# Patient Record
Sex: FEMALE | Employment: FULL TIME | URBAN - METROPOLITAN AREA
[De-identification: names, ages, dates, MRNs, and addresses within clinical notes are randomized per-mention and may not be internally consistent; named-entity substitution may affect disease eponyms.]

---

## 2018-06-17 ENCOUNTER — HOSPITAL ENCOUNTER (EMERGENCY)
Facility: CLINIC | Age: 23
Discharge: HOME | End: 2018-06-17
Attending: EMERGENCY MEDICINE | Admitting: EMERGENCY MEDICINE
Payer: COMMERCIAL

## 2018-06-17 VITALS
OXYGEN SATURATION: 99 % | TEMPERATURE: 98 F | SYSTOLIC BLOOD PRESSURE: 132 MMHG | RESPIRATION RATE: 18 BRPM | WEIGHT: 217 LBS | DIASTOLIC BLOOD PRESSURE: 92 MMHG | HEART RATE: 81 BPM

## 2018-06-17 DIAGNOSIS — R10.13 ABDOMINAL PAIN, EPIGASTRIC: ICD-10-CM

## 2018-06-17 LAB
ALBUMIN SERPL-MCNC: 3.7 G/DL (ref 3.4–5)
ALP SERPL-CCNC: 69 U/L (ref 40–150)
ALT SERPL W P-5'-P-CCNC: 28 U/L (ref 0–50)
ANION GAP SERPL CALCULATED.3IONS-SCNC: 6 MMOL/L (ref 3–14)
AST SERPL W P-5'-P-CCNC: 23 U/L (ref 0–45)
BASOPHILS # BLD AUTO: 0 10E9/L (ref 0–0.2)
BASOPHILS NFR BLD AUTO: 0.2 %
BILIRUB SERPL-MCNC: 0.2 MG/DL (ref 0.2–1.3)
BUN SERPL-MCNC: 17 MG/DL (ref 7–30)
CALCIUM SERPL-MCNC: 9.1 MG/DL (ref 8.5–10.1)
CHLORIDE SERPL-SCNC: 108 MMOL/L (ref 94–109)
CO2 SERPL-SCNC: 24 MMOL/L (ref 20–32)
CREAT SERPL-MCNC: 0.68 MG/DL (ref 0.52–1.04)
DIFFERENTIAL METHOD BLD: NORMAL
EOSINOPHIL # BLD AUTO: 0.2 10E9/L (ref 0–0.7)
EOSINOPHIL NFR BLD AUTO: 2.1 %
ERYTHROCYTE [DISTWIDTH] IN BLOOD BY AUTOMATED COUNT: 12.9 % (ref 10–15)
GFR SERPL CREATININE-BSD FRML MDRD: >90 ML/MIN/1.7M2
GLUCOSE SERPL-MCNC: 103 MG/DL (ref 70–99)
HCG SERPL QL: NEGATIVE
HCT VFR BLD AUTO: 41.9 % (ref 35–47)
HGB BLD-MCNC: 14.4 G/DL (ref 11.7–15.7)
IMM GRANULOCYTES # BLD: 0.1 10E9/L (ref 0–0.4)
IMM GRANULOCYTES NFR BLD: 0.6 %
LIPASE SERPL-CCNC: 129 U/L (ref 73–393)
LYMPHOCYTES # BLD AUTO: 2.5 10E9/L (ref 0.8–5.3)
LYMPHOCYTES NFR BLD AUTO: 30.4 %
MCH RBC QN AUTO: 30.3 PG (ref 26.5–33)
MCHC RBC AUTO-ENTMCNC: 34.4 G/DL (ref 31.5–36.5)
MCV RBC AUTO: 88 FL (ref 78–100)
MONOCYTES # BLD AUTO: 0.7 10E9/L (ref 0–1.3)
MONOCYTES NFR BLD AUTO: 8.2 %
NEUTROPHILS # BLD AUTO: 4.7 10E9/L (ref 1.6–8.3)
NEUTROPHILS NFR BLD AUTO: 58.5 %
NRBC # BLD AUTO: 0 10*3/UL
NRBC BLD AUTO-RTO: 0 /100
PLATELET # BLD AUTO: 306 10E9/L (ref 150–450)
POTASSIUM SERPL-SCNC: 4.2 MMOL/L (ref 3.4–5.3)
PROT SERPL-MCNC: 8.1 G/DL (ref 6.8–8.8)
RBC # BLD AUTO: 4.76 10E12/L (ref 3.8–5.2)
SODIUM SERPL-SCNC: 138 MMOL/L (ref 133–144)
WBC # BLD AUTO: 8.1 10E9/L (ref 4–11)

## 2018-06-17 PROCEDURE — 84703 CHORIONIC GONADOTROPIN ASSAY: CPT | Performed by: EMERGENCY MEDICINE

## 2018-06-17 PROCEDURE — 96374 THER/PROPH/DIAG INJ IV PUSH: CPT

## 2018-06-17 PROCEDURE — 99284 EMERGENCY DEPT VISIT MOD MDM: CPT | Mod: 25

## 2018-06-17 PROCEDURE — 80053 COMPREHEN METABOLIC PANEL: CPT | Performed by: EMERGENCY MEDICINE

## 2018-06-17 PROCEDURE — 25000125 ZZHC RX 250: Performed by: EMERGENCY MEDICINE

## 2018-06-17 PROCEDURE — 83690 ASSAY OF LIPASE: CPT | Performed by: EMERGENCY MEDICINE

## 2018-06-17 PROCEDURE — 85025 COMPLETE CBC W/AUTO DIFF WBC: CPT | Performed by: EMERGENCY MEDICINE

## 2018-06-17 PROCEDURE — 25000132 ZZH RX MED GY IP 250 OP 250 PS 637: Performed by: EMERGENCY MEDICINE

## 2018-06-17 RX ADMIN — FAMOTIDINE 20 MG: 10 INJECTION INTRAVENOUS at 13:28

## 2018-06-17 RX ADMIN — LIDOCAINE HYDROCHLORIDE 30 ML: 20 SOLUTION ORAL; TOPICAL at 13:28

## 2018-06-17 ASSESSMENT — ENCOUNTER SYMPTOMS
DIARRHEA: 0
CONSTIPATION: 0
VOMITING: 0
ABDOMINAL PAIN: 1
NAUSEA: 1

## 2018-06-17 NOTE — ED AVS SNAPSHOT
Emergency Department    [address] [facility] [address]    Phone:  [phone]    Fax:  [phone]                                       Paris Reddy   MRN: [id number]    Department:   Emergency Department   Date of Visit:  6/17/2018           Patient Information     Date Of Birth          1995        Your diagnoses for this visit were:     Abdominal pain, epigastric        You were seen by Cedric Crouch MD.      Follow-up Information     Call Your Primary Care Doctor.        Follow up with  Emergency Department.    Specialty:  EMERGENCY MEDICINE    Why:  As needed, If symptoms worsen    Contact information:    [address] [facility] [address] [phone]        Discharge Instructions         Gastritis (Adult)    Gastritis is inflammation and irritation of the stomach lining. It can be present for a short time (acute) or be long lasting (chronic). Gastritis is often caused by infection with bacteria called H pylori. More than a third of people in the  have this bacteria in their bodies. In many cases, H pylori causes no problems or symptoms. In some people, though, the infection irritates the stomach lining and causes gastritis. Other causes of stomach irritation include drinking alcohol or taking pain-relieving medicines called NSAIDs (such as aspirin or ibuprofen).   Symptoms of gastritis can include:    Abdominal pain or bloating    Loss of appetite    Nausea or vomiting    Vomiting blood or having black stools    Feeling more tired than usual  An inflamed and irritated stomach lining is more likely to develop a sore called an ulcer. To help prevent this, gastritis should be treated.  Home care  If needed, medicines may be prescribed. If you have H pylori infection, treating it will likely relieve your symptoms. Other changes can help reduce stomach irritation and help it heal.    If you have been prescribed medicines for H pylori infection,  take them as directed. Take all of the medicine until it is finished or your healthcare provider tells you to stop, even if you feel better.    Your healthcare provider may recommend avoiding NSAIDs. If you take daily aspirin for your heart or other medical reasons, do not stop without talking to your healthcare provider first.    Avoid drinking alcohol.    Stop smoking. Smoking can irritate the stomach and delay healing. As much as possible, stay away from second hand smoke.  Follow-up care  Follow up with your healthcare provider, or as advised by our staff. Testing may be needed to check for inflammation or an ulcer.  When to seek medical advice  Call your healthcare provider for any of the following:    Stomach pain that gets worse or moves to the lower right abdomen (appendix area)    Chest pain that appears or gets worse, or spreads to the back, neck, shoulder, or arm    Frequent vomiting (can t keep down liquids)    Blood in the stool or vomit (red or black in color)    Feeling weak or dizzy    Fever of 100.4 F (38 C) or higher, or as directed by your healthcare provider  Date Last Reviewed: 6/22/2015 2000-2017 [employer] [address] [place] [address]. All rights reserved. This information is not intended as a substitute for professional medical care. Always follow your healthcare professional's instructions.          24 Hour Appointment Hotline       To make an appointment at any [facility] clinic, call [phone] ([phone]). If you don't have a family doctor or clinic, we will help you find one. [facility] clinics are conveniently located to serve the needs of you and your family.             Review of your medicines      START taking        Dose / Directions Last dose taken    omeprazole 20 MG CR capsule   Commonly known as:  priLOSEC   Dose:  20 mg   Quantity:  15 capsule        Take 1 capsule (20 mg) by mouth daily   Refills:  0          Our records show that you are  taking the medicines listed below. If these are incorrect, please call your family doctor or clinic.        Dose / Directions Last dose taken    SERTRALINE HCL PO        Take by mouth daily   Refills:  0                Prescriptions were sent or printed at these locations (1 Prescription)                   Other Prescriptions                Printed at Department/Unit printer (1 of 1)         omeprazole (PRILOSEC) 20 MG CR capsule                Procedures and tests performed during your visit     CBC with platelets + differential    Comprehensive metabolic panel    HCG QUALitative pregnancy (blood)    Lipase      Orders Needing Specimen Collection     None      Pending Results     No orders found from 6/15/2018 to 6/18/2018.            Pending Culture Results     No orders found from 6/15/2018 to 6/18/2018.            Pending Results Instructions     If you had any lab results that were not finalized at the time of your Discharge, you can call the ED Lab Result RN at [phone]. You will be contacted by this team for any positive Lab results or changes in treatment. The nurses are available 7 days a week from 10A to 6:30P.  You can leave a message 24 hours per day and they will return your call.        Test Results From Your Hospital Stay        6/17/2018  1:37 PM      Component Results     Component Value Ref Range & Units Status    WBC 8.1 4.0 - 11.0 10e9/L Final    RBC Count 4.76 3.8 - 5.2 10e12/L Final    Hemoglobin 14.4 11.7 - 15.7 g/dL Final    Hematocrit 41.9 35.0 - 47.0 % Final    MCV 88 78 - 100 fl Final    MCH 30.3 26.5 - 33.0 pg Final    MCHC 34.4 31.5 - 36.5 g/dL Final    RDW 12.9 10.0 - 15.0 % Final    Platelet Count 306 150 - 450 10e9/L Final    Diff Method Automated Method  Final    % Neutrophils 58.5 % Final    % Lymphocytes 30.4 % Final    % Monocytes 8.2 % Final    % Eosinophils 2.1 % Final    % Basophils 0.2 % Final    % Immature Granulocytes 0.6 % Final    Nucleated RBCs 0 0 /100 Final     Absolute Neutrophil 4.7 1.6 - 8.3 10e9/L Final    Absolute Lymphocytes 2.5 0.8 - 5.3 10e9/L Final    Absolute Monocytes 0.7 0.0 - 1.3 10e9/L Final    Absolute Eosinophils 0.2 0.0 - 0.7 10e9/L Final    Absolute Basophils 0.0 0.0 - 0.2 10e9/L Final    Abs Immature Granulocytes 0.1 0 - 0.4 10e9/L Final    Absolute Nucleated RBC 0.0  Final         6/17/2018  1:55 PM      Component Results     Component Value Ref Range & Units Status    Sodium 138 133 - 144 mmol/L Final    Potassium 4.2 3.4 - 5.3 mmol/L Final    Chloride 108 94 - 109 mmol/L Final    Carbon Dioxide 24 20 - 32 mmol/L Final    Anion Gap 6 3 - 14 mmol/L Final    Glucose 103 (H) 70 - 99 mg/dL Final    Urea Nitrogen 17 7 - 30 mg/dL Final    Creatinine 0.68 0.52 - 1.04 mg/dL Final    GFR Estimate >90 >60 mL/min/1.7m2 Final    Non  GFR Calc    GFR Estimate If Black >90 >60 mL/min/1.7m2 Final    African American GFR Calc    Calcium 9.1 8.5 - 10.1 mg/dL Final    Bilirubin Total 0.2 0.2 - 1.3 mg/dL Final    Albumin 3.7 3.4 - 5.0 g/dL Final    Protein Total 8.1 6.8 - 8.8 g/dL Final    Alkaline Phosphatase 69 40 - 150 U/L Final    ALT 28 0 - 50 U/L Final    AST 23 0 - 45 U/L Final         6/17/2018  1:53 PM      Component Results     Component Value Ref Range & Units Status    Lipase 129 73 - 393 U/L Final         6/17/2018  1:48 PM      Component Results     Component Value Ref Range & Units Status    HCG Qualitative Serum Negative NEG^Negative Final    This test is for screening purposes.  Results should be interpreted along with   the clinical picture.  Confirmation testing is available if warranted by   ordering [id number], HCG Quantitative Pregnancy.                  Clinical Quality Measure: Blood Pressure Screening     Your blood pressure was checked while you were in the emergency department today. The last reading we obtained was  BP: 127/90 . Please read the guidelines below about what these numbers mean and what you should do about them.  If  "your systolic blood pressure (the top number) is less than 120 and your diastolic blood pressure (the bottom number) is less than 80, then your blood pressure is normal. There is nothing more that you need to do about it.  If your systolic blood pressure (the top number) is 120-139 or your diastolic blood pressure (the bottom number) is 80-89, your blood pressure may be higher than it should be. You should have your blood pressure rechecked within a year by a primary care provider.  If your systolic blood pressure (the top number) is 140 or greater or your diastolic blood pressure (the bottom number) is 90 or greater, you may have high blood pressure. High blood pressure is treatable, but if left untreated over time it can put you at risk for heart attack, stroke, or kidney failure. You should have your blood pressure rechecked by a primary care provider within the next 4 weeks.  If your provider in the emergency department today gave you specific instructions to follow-up with your doctor or provider even sooner than that, you should follow that instruction and not wait for up to 4 weeks for your follow-up visit.        Thank you for choosing [facility]       Thank you for choosing [facility] for your care. Our goal is always to provide you with excellent care. Hearing back from our patients is one way we can continue to improve our services. Please take a few minutes to complete the written survey that you may receive in the mail after you visit with us. Thank you!        [employer] Information     [employer] lets you send messages to your doctor, view your test results, renew your prescriptions, schedule appointments and more. To sign up, go to [email or web address] . Click on \"Log in\" on the left side of the screen, which will take you to the Welcome page. Then click on \"Sign up Now\" on the right side of the page.     You will be asked to enter the access code listed below, as well as some personal information. Please " follow the directions to create your username and password.     Your access code is: [email or web address]  Expires: 9/15/2018  2:39 PM     Your access code will  in 90 days. If you need help or a new code, please call your [facility] clinic or [phone].        Care EveryWhere ID     This is your Care EveryWhere ID. This could be used by other organizations to access your [facility] medical records  [id number]        Equal Access to Services     [facility] : Hadii aad ku hadasho Soomaali, waaxda luqadaha, qaybta kaalmada adetiff, saurav hickey. So [facility] [phone].    ATENCIÓN: Si habla español, tiene a brian disposición servicios gratuitos de asistencia lingüística. Llame al [phone].    We comply with applicable federal civil rights laws and [place] laws. We do not discriminate on the basis of race, color, national origin, age, disability, sex, sexual orientation, or gender identity.            After Visit Summary       This is your record. Keep this with you and show to your community pharmacist(s) and doctor(s) at your next visit.

## 2018-06-17 NOTE — ED AVS SNAPSHOT
Emergency Department    [address] [place] [address]    Phone:  [phone]    Fax:  [phone]                                       Paris Reddy   MRN: [id number]    Department:   Emergency Department   Date of Visit:  6/17/2018           After Visit Summary Signature Page     I have received my discharge instructions, and my questions have been answered. I have discussed any challenges I see with this plan with the nurse or doctor.    ..........................................................................................................................................  Patient/Patient Representative Signature      ..........................................................................................................................................  Patient Representative Print Name and Relationship to Patient    ..................................................               ................................................  Date                                            Time    ..........................................................................................................................................  Reviewed by Signature/Title    ...................................................              ..............................................  Date                                                            Time

## 2018-06-17 NOTE — ED PROVIDER NOTES
History     Chief Complaint:  Abdominal Pain       HPI   Paris Reddy is a 22 year old female who presents to the emergency department today for evaluation of abdominal pain. The patient reports she developed upper abdominal pain 1 hour prior to arrival. She has taken Zofran for her nausea prior to arrival. Additionally, she has had nothing to eat or drink today but did have pizza last night. She has had this pain before about once each month for the last couple of months. She did have her gallstone removed and compares her symptoms to that experience. She denies diarrhea, constipation, emesis, and urinary symptoms.       Allergies:  No Known Drug Allergies        Medications:    SERTRALINE HCL PO       Past Medical History:    History reviewed. No pertinent past medical history.       Past Surgical History:    Cholecystectomy      Family History:    History reviewed. No pertinent family history.      Social History:  The patient was accompanied to the ED by family.  Smoking Status: Never Smoker  Smokeless Tobacco: Never Used  Alcohol Use: Yes   Marital Status:  Single [1]       Review of Systems   Constitutional: Negative for fever.   Respiratory: Negative for cough and shortness of breath.    Cardiovascular: Negative for chest pain.   Gastrointestinal: Positive for abdominal pain (upper) and nausea. Negative for constipation, diarrhea and vomiting.   Genitourinary: Negative.          Physical Exam   First Vitals:  BP: 127/90  Pulse: 88  Heart Rate: 88  Temp: 98  F (36.7  C)  Resp: 16  Weight: 98.4 kg (217 lb)  SpO2: 96 %      Physical Exam  General: Appears well-developed and well-nourished.   Head: No signs of trauma.   CV: Normal rate and regular rhythm.    Resp: Effort normal and breath sounds normal. No respiratory distress.   GI: Soft. There is mild epigastric tenderness.  No rebound or guarding.  Normal bowel sounds.  No CVA tenderness.  MSK: Normal range of motion. no edema. No Calf  tenderness.  Neuro: The patient is alert and oriented.  Speech normal.  GCS 15  Skin: Skin is warm and dry. No rash noted.   Psych: normal mood and affect. behavior is normal.     Emergency Department Course     Laboratory:  Laboratory findings were communicated with the patient who voiced understanding of the findings.    CBC: WBC 8.1, HGB 14.4,    CMP: Glucose 103 (H) o/w WNL. (Creatinine 0.68)   Lipase: 129   HCG Qualitative pregnancy (blood): Negative    Interventions:  1328 GI Cocktail 30mg PO   1328 Pepcid 20 mg IV       Emergency Department Course:  Nursing notes and vitals reviewed.  1318: I performed an exam of the patient as documented above.   IV was inserted and blood was drawn for laboratory testing, results above.   1426 Patient rechecked and updated.    Findings and plan explained to the Patient. Patient discharged home with instructions regarding supportive care, medications, and reasons to return. The importance of close follow-up was reviewed.   I personally reviewed the laboratory  results with the Patient and answered all related questions prior to  discharge.   Impression & Plan      Medical Decision Making:  Paris Reddy is a 22-year-old woman presents due to epigastric abdominal pain.  Pain started approximately an 1.5 hour prior to arrival.  Upon my evaluation, she did have some mild epigastric tenderness but no peritoneal findings.  She has a history of a cholecystectomy in the past.  Blood work was obtained that did not show any concerning findings with normal LFTs and lipase.  Patient did report that she had pizza and beer last night.  Based on her negative workup, history, and exam I feel that she likely has a degree of gastritis.  Patient was discharged home with recommendations for diet modification along with omeprazole.  She instructed to return to the ER if she had worsening pain, fevers, or other new concerning symptoms.      Diagnosis:    ICD-10-CM    1. Abdominal pain,  epigastric R10.13        Disposition:  Discharged to home with the below prescription.    Discharge Medications:  New Prescriptions    OMEPRAZOLE (PRILOSEC) 20 MG CR CAPSULE    Take 1 capsule (20 mg) by mouth daily       Scribe Disclosure:  I, Carolyne Almodovar, am serving as a scribe at 1:17 PM on 6/17/2018 to document services personally performed by Cedric Crouch MD based on my observations and the provider's statements to me.    Carolyne Almodovar  6/17/2018    EMERGENCY DEPARTMENT       Cedric Crouch MD  06/18/18 [id number]

## 2018-06-17 NOTE — DISCHARGE INSTRUCTIONS
Gastritis (Adult)    Gastritis is inflammation and irritation of the stomach lining. It can be present for a short time (acute) or be long lasting (chronic). Gastritis is often caused by infection with bacteria called H pylori. More than a third of people in the [place] have this bacteria in their bodies. In many cases, H pylori causes no problems or symptoms. In some people, though, the infection irritates the stomach lining and causes gastritis. Other causes of stomach irritation include drinking alcohol or taking pain-relieving medicines called NSAIDs (such as aspirin or ibuprofen).   Symptoms of gastritis can include:    Abdominal pain or bloating    Loss of appetite    Nausea or vomiting    Vomiting blood or having black stools    Feeling more tired than usual  An inflamed and irritated stomach lining is more likely to develop a sore called an ulcer. To help prevent this, gastritis should be treated.  Home care  If needed, medicines may be prescribed. If you have H pylori infection, treating it will likely relieve your symptoms. Other changes can help reduce stomach irritation and help it heal.    If you have been prescribed medicines for H pylori infection, take them as directed. Take all of the medicine until it is finished or your healthcare provider tells you to stop, even if you feel better.    Your healthcare provider may recommend avoiding NSAIDs. If you take daily aspirin for your heart or other medical reasons, do not stop without talking to your healthcare provider first.    Avoid drinking alcohol.    Stop smoking. Smoking can irritate the stomach and delay healing. As much as possible, stay away from second hand smoke.  Follow-up care  Follow up with your healthcare provider, or as advised by our staff. Testing may be needed to check for inflammation or an ulcer.  When to seek medical advice  Call your healthcare provider for any of the following:    Stomach pain that gets worse or moves to the lower  right abdomen (appendix area)    Chest pain that appears or gets worse, or spreads to the back, neck, shoulder, or arm    Frequent vomiting (can t keep down liquids)    Blood in the stool or vomit (red or black in color)    Feeling weak or dizzy    Fever of 100.4 F (38 C) or higher, or as directed by your healthcare provider  Date Last Reviewed: 6/22/2015 2000-2017 [employer]. [address] [place] [address], [place] [id number]. All rights reserved. This information is not intended as a substitute for professional medical care. Always follow your healthcare professional's instructions.

## 2018-06-18 ASSESSMENT — ENCOUNTER SYMPTOMS
SHORTNESS OF BREATH: 0
COUGH: 0
FEVER: 0

## 2018-07-22 ENCOUNTER — OFFICE VISIT (OUTPATIENT)
Dept: URGENT CARE | Facility: URGENT CARE | Age: 23
End: 2018-07-22
Payer: COMMERCIAL

## 2018-07-22 VITALS
SYSTOLIC BLOOD PRESSURE: 123 MMHG | HEART RATE: 80 BPM | WEIGHT: 230 LBS | OXYGEN SATURATION: 99 % | DIASTOLIC BLOOD PRESSURE: 86 MMHG | TEMPERATURE: 98.2 F

## 2018-07-22 DIAGNOSIS — B02.9 HERPES ZOSTER WITHOUT COMPLICATION: Primary | ICD-10-CM

## 2018-07-22 PROCEDURE — 99203 OFFICE O/P NEW LOW 30 MIN: CPT | Performed by: FAMILY MEDICINE

## 2018-07-22 RX ORDER — FAMCICLOVIR 500 MG/1
500 TABLET ORAL 3 TIMES DAILY
Qty: 21 TABLET | Refills: 0 | Status: SHIPPED | OUTPATIENT
Start: 2018-07-22 | End: 2019-03-04

## 2018-07-22 RX ORDER — COPPER 313.4 MG/1
1 INTRAUTERINE DEVICE INTRAUTERINE ONCE
COMMUNITY
End: 2019-02-08 | Stop reason: ALTCHOICE

## 2018-07-22 NOTE — MR AVS SNAPSHOT
"              After Visit Summary   2018    Paris Reddy    MRN: [id number]           Patient Information     Date Of Birth          1995        Visit Information        Provider Department      2018 2:25 PM Glenn Che MD [facility]        Today's Diagnoses     Herpes zoster without complication    -  1       Follow-ups after your visit        Who to contact     If you have questions or need follow up information about today's clinic visit or your schedule please contact [facility] directly at [phone].  Normal or non-critical lab and imaging results will be communicated to you by WhereNethart, letter or phone within 4 business days after the clinic has received the results. If you do not hear from us within 7 days, please contact the clinic through WhereNethart or phone. If you have a critical or abnormal lab result, we will notify you by phone as soon as possible.  Submit refill requests through brick&mobile or call your pharmacy and they will forward the refill request to us. Please allow 3 business days for your refill to be completed.          Additional Information About Your Visit        MyChart Information     brick&mobile lets you send messages to your doctor, view your test results, renew your prescriptions, schedule appointments and more. To sign up, go to [email or web address] . Click on \"Log in\" on the left side of the screen, which will take you to the Welcome page. Then click on \"Sign up Now\" on the right side of the page.     You will be asked to enter the access code listed below, as well as some personal information. Please follow the directions to create your username and password.     Your access code is: [email or web address]  Expires: 9/15/2018  2:39 PM     Your access code will  in 90 days. If you need help or a new code, please call your [place] clinic or [phone].        Care EveryWhere ID     This is your Care EveryWhere ID. " This could be used by other organizations to access your [place] medical records  [id number]        Your Vitals Were     Pulse Temperature Pulse Oximetry             80 98.2  F (36.8  C) (Tympanic) 99%          Blood Pressure from Last 3 Encounters:   07/22/18 123/86   06/17/18 (!) 132/92   07/17/14 110/80    Weight from Last 3 Encounters:   07/22/18 230 lb (104.3 kg)   06/17/18 217 lb (98.4 kg)   07/17/14 200 lb (90.7 kg) (97 %)*     * Growth percentiles are based on [facility] 2-20 Years data.              Today, you had the following     No orders found for display         Today's Medication Changes          These changes are accurate as of 7/22/18  2:47 PM.  If you have any questions, ask your nurse or doctor.               Start taking these medicines.        Dose/Directions    famciclovir 500 MG tablet   Commonly known as:  FAMVIR   Used for:  Herpes zoster without complication   Started by:  Glenn Che MD        Dose:  500 mg   Take 1 tablet (500 mg) by mouth 3 times daily   Quantity:  21 tablet   Refills:  0            Where to get your medicines      These medications were sent to [facility] [address] [place] [address] AT [address] [address]    Hours:  24-hours Phone:  [phone]     famciclovir 500 MG tablet                Primary Care Provider Fax #    Physician No Ref-Primary [phone]       No address on file        Equal Access to Services     YUAN ORDOÑEZ : Hadii ailyn oakleyo Sodejuanali, waaxda luqadaha, qaybta kaalmada adeegyada, wax radha hickey. So [facility] [phone].    ATENCIÓN: Si habla español, tiene a brian disposición servicios gratuitos de asistencia lingüística. Llame al [phone].    We comply with applicable federal civil rights laws and [place] laws. We do not discriminate on the basis of race, color, national origin, age, disability, sex, sexual orientation, or gender  identity.            Thank you!     Thank you for choosing [facility]  for your care. Our goal is always to provide you with excellent care. Hearing back from our patients is one way we can continue to improve our services. Please take a few minutes to complete the written survey that you may receive in the mail after your visit with us. Thank you!             Your Updated Medication List - Protect others around you: Learn how to safely use, store and throw away your medicines at www.disposemymeds.org.          This list is accurate as of 7/22/18  2:47 PM.  Always use your most recent med list.                   Brand Name Dispense Instructions for use Diagnosis    famciclovir 500 MG tablet    FAMVIR    21 tablet    Take 1 tablet (500 mg) by mouth 3 times daily    Herpes zoster without complication       paragard intrauterine copper      1 each by Intrauterine route once        SERTRALINE HCL PO      Take by mouth daily

## 2018-07-22 NOTE — PROGRESS NOTES
Subjective: 4 days ago patient developed some pain in the posterior head on the right side coming forward, was seen in urgent care but she did not have any cutaneous signs at that point and they were wondering about shingles, and today she has developed a little patch of clustered raised bumps on the right cheek.  He remains very painful.    Objective: The right cheek area is mildly swollen but there is a grouping of raised papules on the right cheek and possibly one in the posterior scalp as well but it is harder to see.  She has a nonenlarged lymph node in that area and in the right neck.  She appears well.    Assessment and plan: Pattern certainly suggests shingles and the grouped vesicles suggests shingles as well.  Discussed infectivity and likely course, will treat with Famvir for 7 days.

## 2019-02-04 NOTE — PROGRESS NOTES
[facility]  Primary Care Center   Monae Cochran, SHAKIRA CNP  02/08/2019      Chief Complaint:   Establish Care       History of Present Illness:   Paris Reddy is a 23 year old female with a history of depression who presents to establish care    She was previously seen at [facility]. Was in-between jobs and lost insurance, but now has coverage again and needs to re-establish.  Was being seen for depression, taking 200 mg Sertraline--did not feel this helped very much, maybe a little bit but didn't feel like it helped to where she wanted to be. She stopped the Sertraline when she lost her insurance coverage several months ago, and didn't feel any difference with stopping this, did not experience withdrawal symptoms when coming off the medication, which makes her feel like it wasn't doing anything anyway. Feels her mood is okay overall, not as sad as she was previously--it was more situational.    Primarily concerned that she has had binge eating disorder over the last 6 months. Has been since working full-time and taking classes as well. She used to binge 2-3x per week, but now binging almost every day, feels uncontrollable. Has been trying to get back on track--able to control more when at work, since she only brings an appropriate amount of food, but when she goes home it feels hard to control and she eats even when she isn't hungry. This has caused a sense of feeling guilty and disappointed in herself. She has gained weight as well, which she is discouraged about.     Also inquires about her heartrate--when training for this job a nurse told her she has irregular heart beat. Occasionally feels a palpitation, lasts a couple seconds, no CP, SOB.     Review of Systems:   Pertinent items are noted in HPI, remainder of complete ROS is negative.    PHQ-9 SCORE 2/8/2019   PHQ-9 Total Score 9       Active Medications:     Current Outpatient Medications:      FLUoxetine 20 MG tablet, Take 1  tablet (20 mg) by mouth daily, Disp: 90 tablet, Rfl: 1     famciclovir (FAMVIR) 500 MG tablet, Take 1 tablet (500 mg) by mouth 3 times daily (Patient not taking: Reported on 2/8/2019), Disp: 21 tablet, Rfl: 0     paragard intrauterine copper, 1 each by Intrauterine route once, Disp: , Rfl:       Allergies:   Patient has no known allergies.      Past Medical History:  History reviewed. No pertinent past medical history.  There is no problem list on file for this patient.       Past Surgical History:  Past Surgical History:   Procedure Laterality Date     CHOLECYSTECTOMY  2018       Family History:   Family History   Problem Relation Age of Onset     Cervical Cancer Mother         HPV+         Social History:   Social History     Tobacco Use     Smoking status: Never Smoker     Smokeless tobacco: Never Used   Substance Use Topics     Alcohol use: Yes     Frequency: 2-4 times a month     Drug use: Yes     Types: Marijuana        Physical Exam:   /82   Pulse 82   Wt 100.8 kg (222 lb 4.8 oz)   SpO2 96%    Constitutional: no distress, comfortable, pleasant, well-groomed  Eyes: anicteric, conjunctiva pink, normal extra-ocular movements   Ears, Nose and Throat:nose clear and free of lesions, throat clear, mucosa pink and moist.   Neck: supple with full range of motion, no thyromegaly.   Cardiovascular: regular rate and rhythm, normal S1 and S2, no murmurs, rubs or gallops  Respiratory: clear to auscultation with good air movement bilaterally, no wheezes or crackles, non-labored  Gastrointestinal: positive bowel sounds, nontender, no hepatosplenomegaly, no masses   Neurological: cranial nerves grossly intact,  gait is steady with intact balance, speech is clear, no tremor   Psychological: appropriate mood, demonstrates intact judgment and logical thought process, maintains good eye contact     Assessment and Plan:  Binge eating disorder  Mild major depression (H)  Discussed treatment options, first line serotonin  specific reuptake inhibitor; will start Fluoxetine 20 mg to treat both binge eating and mild depression. Reviewed potential side effects.  She did ask about Vyvanse, which I looked into--is FDA approved for mod-severe binge eating disorder, so may consider this as an option if Fluoxetine is not effective. Also encouraged establishing with health psychologist for therapy, as medication is most effective when done in conjunction with psychotherapy. Will also screen TSH given increased eating and glucose.  - MENTAL HEALTH REFERRAL  - Adult; Outpatient Treatment; Individual/Couples/Family/Group Therapy/Health Psychology; [facility]MP: Health Psychology - Clarisse Diez [phone]; Patient call to schedule  - FLUoxetine 20 MG tablet  Dispense: 90 tablet; Refill: 1  - TSH  - Basic metabolic panel    Vitamin D deficiency  Will recheck Vit D. Currently taking 4000 units Vit D.  - Vitamin D Deficiency    Encounter to establish care  History reviewed and updated in chart       Follow-up: Return in about 4 weeks (around 3/8/2019). to review med tolerance and symptoms        Scribe Disclosure:   I, Edith Marino, am serving as a scribe to document services personally performed by SHAKIRA Pulido CNP at this visit, based upon the provider's statements to me. All documentation has been reviewed by the aforementioned provider prior to being entered into the official medical record.     Portions of this medical record were completed by a scribe. UPON MY REVIEW AND AUTHENTICATION BY ELECTRONIC SIGNATURE, this confirms (a) I performed the applicable clinical services, and (b) the record is accurate.    SHAKIRA Pulido CNP

## 2019-02-08 ENCOUNTER — OFFICE VISIT (OUTPATIENT)
Dept: INTERNAL MEDICINE | Facility: CLINIC | Age: 24
End: 2019-02-08
Payer: COMMERCIAL

## 2019-02-08 VITALS
HEART RATE: 82 BPM | WEIGHT: 222.3 LBS | OXYGEN SATURATION: 96 % | DIASTOLIC BLOOD PRESSURE: 82 MMHG | SYSTOLIC BLOOD PRESSURE: 110 MMHG

## 2019-02-08 DIAGNOSIS — F32.0 MILD MAJOR DEPRESSION (H): ICD-10-CM

## 2019-02-08 DIAGNOSIS — F50.819 BINGE EATING DISORDER: Primary | ICD-10-CM

## 2019-02-08 DIAGNOSIS — F50.819 BINGE EATING DISORDER: ICD-10-CM

## 2019-02-08 DIAGNOSIS — Z97.5 IUD (INTRAUTERINE DEVICE) IN PLACE: ICD-10-CM

## 2019-02-08 DIAGNOSIS — E55.9 VITAMIN D DEFICIENCY: ICD-10-CM

## 2019-02-08 DIAGNOSIS — Z76.89 ENCOUNTER TO ESTABLISH CARE: ICD-10-CM

## 2019-02-08 PROBLEM — F33.0 MILD EPISODE OF RECURRENT MAJOR DEPRESSIVE DISORDER (H): Status: ACTIVE | Noted: 2017-12-16

## 2019-02-08 LAB
ANION GAP SERPL CALCULATED.3IONS-SCNC: 6 MMOL/L (ref 3–14)
BUN SERPL-MCNC: 20 MG/DL (ref 7–30)
CALCIUM SERPL-MCNC: 9.1 MG/DL (ref 8.5–10.1)
CHLORIDE SERPL-SCNC: 105 MMOL/L (ref 94–109)
CO2 SERPL-SCNC: 26 MMOL/L (ref 20–32)
CREAT SERPL-MCNC: 0.89 MG/DL (ref 0.52–1.04)
GFR SERPL CREATININE-BSD FRML MDRD: >90 ML/MIN/{1.73_M2}
GLUCOSE SERPL-MCNC: 88 MG/DL (ref 70–99)
POTASSIUM SERPL-SCNC: 3.7 MMOL/L (ref 3.4–5.3)
SODIUM SERPL-SCNC: 137 MMOL/L (ref 133–144)
TSH SERPL DL<=0.005 MIU/L-ACNC: 1.32 MU/L (ref 0.4–4)

## 2019-02-08 RX ORDER — FLUOXETINE 20 MG/1
20 TABLET, FILM COATED ORAL DAILY
Qty: 90 TABLET | Refills: 1 | Status: SHIPPED | OUTPATIENT
Start: 2019-02-08 | End: 2019-02-12

## 2019-02-08 RX ORDER — BUTALBITAL, ACETAMINOPHEN AND CAFFEINE 50; 325; 40 MG/1; MG/1; MG/1
1 TABLET ORAL
COMMUNITY
Start: 2018-06-12 | End: 2019-08-27

## 2019-02-08 SDOH — HEALTH STABILITY: MENTAL HEALTH: HOW OFTEN DO YOU HAVE A DRINK CONTAINING ALCOHOL?: 2-4 TIMES A MONTH

## 2019-02-08 ASSESSMENT — PAIN SCALES - GENERAL: PAINLEVEL: NO PAIN (0)

## 2019-02-08 ASSESSMENT — PATIENT HEALTH QUESTIONNAIRE - PHQ9: SUM OF ALL RESPONSES TO PHQ QUESTIONS 1-9: 9

## 2019-02-08 NOTE — NURSING NOTE
Chief Complaint   Patient presents with     Establish Care     Pt is here to establish care     Juana Hargrove [facility]  8:22 AM 2/8/2019

## 2019-02-08 NOTE — PATIENT INSTRUCTIONS
Primary Care Center Phone Number [phone]  Primary Care Center Medication Refill Request Information:  * Please contact your pharmacy regarding ANY request for medication refills.  ** [facility] Prescription Fax = [phone]  * Please allow 3 business days for routine medication refills.  * Please allow 5 business days for controlled substance medication refills.     Primary Care Center Test Result notification information:  *You will be notified with in 7-10 days of your appointment day regarding the results of your test.  If you are on MyChart you will be notified as soon as the provider has reviewed the results and signed off on them.

## 2019-02-11 ENCOUNTER — TELEPHONE (OUTPATIENT)
Dept: INTERNAL MEDICINE | Facility: CLINIC | Age: 24
End: 2019-02-11

## 2019-02-11 DIAGNOSIS — F32.0 MILD MAJOR DEPRESSION (H): ICD-10-CM

## 2019-02-11 DIAGNOSIS — F50.819 BINGE EATING DISORDER: ICD-10-CM

## 2019-02-11 LAB — DEPRECATED CALCIDIOL+CALCIFEROL SERPL-MC: 26 UG/L (ref 20–75)

## 2019-02-11 NOTE — TELEPHONE ENCOUNTER
Prior Authorization Retail Medication Request    Medication/Dose: Fluoxetine 20 mg tablet  ICD code (if different than what is on RX):  F50.81, F32.0  Previously Tried and Failed:  unknown  Rationale:  none    Insurance Name:  [employer] RIOS    Insurance ID: [id number]

## 2019-02-15 ENCOUNTER — HEALTH MAINTENANCE LETTER (OUTPATIENT)
Age: 24
End: 2019-02-15

## 2019-03-04 ENCOUNTER — OFFICE VISIT (OUTPATIENT)
Dept: INTERNAL MEDICINE | Facility: CLINIC | Age: 24
End: 2019-03-04
Payer: COMMERCIAL

## 2019-03-04 VITALS
RESPIRATION RATE: 20 BRPM | HEART RATE: 68 BPM | DIASTOLIC BLOOD PRESSURE: 63 MMHG | WEIGHT: 220.6 LBS | OXYGEN SATURATION: 97 % | SYSTOLIC BLOOD PRESSURE: 109 MMHG | TEMPERATURE: 97.8 F

## 2019-03-04 DIAGNOSIS — F50.819 BINGE EATING DISORDER: Primary | ICD-10-CM

## 2019-03-04 DIAGNOSIS — F33.0 MILD EPISODE OF RECURRENT MAJOR DEPRESSIVE DISORDER (H): ICD-10-CM

## 2019-03-04 RX ORDER — LISDEXAMFETAMINE DIMESYLATE 30 MG/1
30 CAPSULE ORAL EVERY MORNING
Qty: 30 CAPSULE | Refills: 0 | Status: SHIPPED | OUTPATIENT
Start: 2019-03-04 | End: 2019-08-27

## 2019-03-04 ASSESSMENT — PAIN SCALES - GENERAL: PAINLEVEL: NO PAIN (0)

## 2019-03-04 NOTE — PROGRESS NOTES
"[facility]   Monae PETERSONEvelyn Cochran, SHAKIRA CNP  03/05/2019      Chief Complaint:   Recheck Medication       History of Present Illness:   Paris Reddy is a 23 year old female with a history of depression and binge eating disorder who presents for medication recheck.    She started Fluoxetine 20 mg shortly after our last visit 4 weeks ago. Initially felt the medication was going okay--did not have any significant side effects, but also didn't feel it was doing anything to help her mood or binge eating urges. About 4 days ago, started having suicidal thoughts after her eyelash appointment had to be cancelled. She was crying excessively, lying in bed, questioned \"Do I even really want to be here anymore\". She was afraid to tell her significant other about this because she didn't want to worry him, but recognized this was an extreme emotional over-reaction, and she normally would have been able to handle something \"insignificant\" like rescheduling an appointment. She stopped her Prozac after experiencing this. Denies any SI or thoughts of self harm since.    In regards to her binge eating, it feels the same as previous, admits to binging at least every other day. 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 Once she gets home, feels like she has \"no impulse control\" and will go buy food if she doesn't have enough at home. She endorses eating to the point of feeling sick, which is the only thing that makes her stop eating. She hasn't vomited, but has felt sick enough to do so. Has \"constant\" acid-reflux d/t over-eating.     Review of Systems:   Pertinent items are noted in HPI, remainder of complete ROS is negative.      Active Medications:      butalbital-acetaminophen-caffeine (FIORICET/ESGIC) -40 MG tablet, Take 1 tablet by mouth, Disp: , Rfl:      FLUoxetine " (PROZAC) 20 MG capsule, Take 1 capsule (20 mg) by mouth daily, Disp: 90 capsule, Rfl: 1     levonorgestrel (LILETTA, 52 MG,) 19.5 MCG/DAY IUD, 1 each (19.5 mcg) by Intrauterine route once for 1 dose Due to remove in 2020, Disp: , Rfl:       Allergies:   No known drug allergies.       Past Medical History:  Recurrent major depressive disorder, mild  Obesity  Vitamin D deficiency     Past Surgical History:  Cholecystectomy 2018    Family History:   Cervical cancer - mother      Social History:   Presents to clinic alone.  Tobacco Use: No previous or current tobacco use.   Alcohol Use: 2 - 4 times per month     Physical Exam:   /63 (BP Location: Right arm, Patient Position: Sitting, Cuff Size: Adult Large)   Pulse 68   Temp 97.8  F (36.6  C) (Oral)   Resp 20   Wt 100.1 kg (220 lb 9.6 oz)   SpO2 97%   Breastfeeding? No    Constitutional: Alert, oriented, pleasant, no acute distress  Head: Normocephalic, atraumatic  Cardiovascular: Regular rate and rhythm, no murmurs, rubs or gallops  Respiratory: Good air movement bilaterally, lungs clear, no wheezes/rales/rhonchi  Psychiatric: normal mentation, affect and mood, demonstrates intact judgment and logical thought processes, maintains good eye contact       Assessment and Plan:  Binge eating disorder, Mild episode of recurrent major depressive disorder (H)  No improvement with binge eating and recent SI since starting the Fluoxetine. We discontinued this.  Will trial Vyvanse 30 mg for binge eating disorder. Reviewed potential side effects. She denies any hx CP or SOB, and has no FMH of MI, heart disease or sudden death. She denies SI or thoughts of self-harm today, feels mood is somewhat down but primarily related to her binge eating impulses and the shame surrounding this behavior. Encouraged her to connect with therapy. Mental Health referral still open from last visit; she was unable to reach anyone for scheduling this. I did also call the [facility], who  do have some evening availability, and could help specifically with targeting her binge eating. She was provided with the number to schedule this.  - lisdexamfetamine (VYVANSE) 30 MG capsule  Dispense: 30 capsule; Refill: 0    Follow-up: in 3-4 weeks, or sooner if needed     SHAKIRA Pulido CNP

## 2019-03-04 NOTE — NURSING NOTE
Chief Complaint   Patient presents with     Recheck Medication     Patient is here for medication recheck       Francisco J Del Castillo CMA ([facility]) at 4:19 PM on 3/4/2019

## 2019-03-06 ENCOUNTER — TELEPHONE (OUTPATIENT)
Dept: PSYCHOLOGY | Facility: CLINIC | Age: 24
End: 2019-03-06

## 2019-03-25 ENCOUNTER — OFFICE VISIT (OUTPATIENT)
Dept: INTERNAL MEDICINE | Facility: CLINIC | Age: 24
End: 2019-03-25
Payer: COMMERCIAL

## 2019-03-25 VITALS
SYSTOLIC BLOOD PRESSURE: 126 MMHG | WEIGHT: 220.6 LBS | OXYGEN SATURATION: 100 % | HEART RATE: 91 BPM | DIASTOLIC BLOOD PRESSURE: 82 MMHG

## 2019-03-25 DIAGNOSIS — F50.819 BINGE EATING DISORDER: ICD-10-CM

## 2019-03-25 RX ORDER — LISDEXAMFETAMINE DIMESYLATE 30 MG/1
30 CAPSULE ORAL DAILY
Qty: 30 CAPSULE | Refills: 0 | Status: SHIPPED | OUTPATIENT
Start: 2019-05-03 | End: 2019-08-27

## 2019-03-25 RX ORDER — LISDEXAMFETAMINE DIMESYLATE 30 MG/1
30 CAPSULE ORAL DAILY
Qty: 30 CAPSULE | Refills: 0 | Status: SHIPPED | OUTPATIENT
Start: 2019-06-03 | End: 2019-08-27

## 2019-03-25 RX ORDER — LISDEXAMFETAMINE DIMESYLATE 30 MG/1
30 CAPSULE ORAL DAILY
Qty: 30 CAPSULE | Refills: 0 | Status: SHIPPED | OUTPATIENT
Start: 2019-04-04 | End: 2019-08-27

## 2019-03-25 ASSESSMENT — PAIN SCALES - GENERAL: PAINLEVEL: NO PAIN (0)

## 2019-03-25 NOTE — PROGRESS NOTES
Paris Reddy is a 23 year old female who comes in for    CC: f/u binge eating  HPI:  Started Vyvanse for binge eating disorder earlier this month. Is surprised it seems to be working as well as it has. Her pharmacist instructed her to take it in the morning to avoid side effects/difficulty sleeping, but she finds that she does not need the medication at work (where she has a limited access to food). Primarily binges between 5-9 pm. Asks about taking this later in the day, rather than early in the morning, so as to have more effectiveness after work.    Otherwise denies side effects. No CP, palpitations or SOB. Can tell when the medication is wearing off, usually around 4 pm, feels a little more tired than usual. Denies anxiety.    Mood is okay, admits to having a lot going on right now. Hasn't been able to get scheduled with therapy d/t schedule. [facility] would be out-of-pocket, as insurance didn't cover. Feels like a little less energy to do things. 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     Feels the medication has helped keep her mind off food urges, not get distracted from what she's doing to get something from the fridge or raid the kitchen.   Has had a few binge episodes, but feels if she takes the medication later in the day, this will go down.     Other issues discussed today:     Patient Active Problem List   Diagnosis     Mild episode of recurrent major depressive disorder (H)     Obesity (BMI 30-39.9)     Vitamin D deficiency     IUD (intrauterine device) in place       Current Outpatient Medications   Medication Sig Dispense Refill     butalbital-acetaminophen-caffeine (FIORICET/ESGIC) -40 MG tablet Take 1 tablet by mouth       levonorgestrel (LILETTA, 52 MG,) 19.5 MCG/DAY IUD 1 each (19.5 mcg) by Intrauterine route once for 1 dose Due to remove in 2020       [START ON 4/4/2019] lisdexamfetamine (VYVANSE) 30 MG capsule Take 1 capsule (30 mg) by  mouth daily 30 capsule 0     [START ON 5/3/2019] lisdexamfetamine (VYVANSE) 30 MG capsule Take 1 capsule (30 mg) by mouth daily 30 capsule 0     [START ON 6/3/2019] lisdexamfetamine (VYVANSE) 30 MG capsule Take 1 capsule (30 mg) by mouth daily 30 capsule 0     lisdexamfetamine (VYVANSE) 30 MG capsule Take 1 capsule (30 mg) by mouth every morning 30 capsule 0         ALLERGIES: Patient has no known allergies.    PAST MEDICAL HX: No past medical history on file.    PAST SURGICAL HX:   Past Surgical History:   Procedure Laterality Date     CHOLECYSTECTOMY  2018       IMMUNIZATION HX:   Immunization History   Administered Date(s) Administered     DTAP (<7y) 1994, 12/31/1995, 05/16/1996, 08/22/1996, 02/11/2000     FLU 6-35 months 12/10/2008, 12/14/2017     HPV Quadrivalent 11/10/2008, 12/10/2008, 05/18/2009, 05/07/2012     Hep B, Peds or Adolescent 1994, 08/22/1996, 11/14/1996     HepA-ped 2 Dose 08/24/2007, 03/19/2008     HepB, Unspecified 1994, 08/22/1996, 11/14/1996     Hib (PRP-T) 1994, 03/31/1995, 05/16/1996, 08/22/1996     Hib, Unspecified 1994, 03/31/1995, 05/16/1996, 08/22/1996     Historical DTP/aP 1994, 03/31/1995, 05/16/1996, 08/22/1996, 02/11/2000     Influenza (IIV3) PF 11/02/2016     Influenza Vaccine IM 3yrs+ 4 Valent IIV4 11/02/2016     MMR 05/16/1996, 08/24/2007     Meningococcal (Menactra ) 08/24/2007     Polio, Unspecified  1994, 03/31/1995, 05/16/1996, 02/11/2000     Poliovirus, inactivated (IPV) 1994, 03/31/1995, 05/16/1996, 02/11/2000     TDAP Vaccine (Adacel) 08/24/2007     Tetanus 06/15/2017       SOCIAL HX:   Social History     Social History Narrative     Not on file       ROS:   CONSTITUTIONAL: no fatigue, no unexpected change in weight  ENT: no ear problems, no mouth problems, no throat problems  RESP: no significant cough, no shortness of breath  CV: no chest pain, no palpitations, no new or worsening peripheral edema  PSYCHIATRIC: see  HPI      OBJECTIVE:  /82   Pulse 91   Wt 100.1 kg (220 lb 9.6 oz)   SpO2 100%    Wt Readings from Last 1 Encounters:   03/25/19 100.1 kg (220 lb 9.6 oz)     Constitutional: no distress, comfortable, pleasant, well-groomed  Eyes: anicteric, conjunctiva pink, normal extra-ocular movements   Ears, Nose and Throat: hroat clear, mucosa pink and moist.   Cardiovascular: regular rate and rhythm, normal S1 and S2, no murmurs, rubs or gallops  Respiratory: clear to auscultation with good air movement bilaterally, no wheezes or crackles, non-labored  Neurological: cranial nerves grossly intact, gait is steady with intact balance, speech is clear, no tremor   Psychological: appropriate mood, demonstrates intact judgment and logical thought process      ASSESSMENT/PLAN:    1. Binge eating disorder  Refill provided at current dose. Discussed okay to try taking later in the day, try 12 pm instead of ~8 am, to see if improvement in binging towards the end of the day. Encouraged seeking therapy to work on additional coping skills, either with [facility] for eating disorders, or Health Psychology (referral placed in February).   - lisdexamfetamine (VYVANSE) 30 MG capsule; Take 1 capsule (30 mg) by mouth daily  Dispense: 30 capsule; Refill: 0  - lisdexamfetamine (VYVANSE) 30 MG capsule; Take 1 capsule (30 mg) by mouth daily  Dispense: 30 capsule; Refill: 0  - lisdexamfetamine (VYVANSE) 30 MG capsule; Take 1 capsule (30 mg) by mouth daily  Dispense: 30 capsule; Refill: 0    FOLLOW UP: in 3-4 month(s) or sooner as needed    SHAKIRA Pulido CNP

## 2019-03-25 NOTE — NURSING NOTE
Chief Complaint   Patient presents with     Medication Follow-up     Pt is here to follow up on medications.      Kathya Carpio LPN at 4:30 PM on 3/25/2019.

## 2019-03-25 NOTE — PATIENT INSTRUCTIONS
[facility] Medication Refill Request Information:  * Please contact your pharmacy regarding ANY request for medication refills.  ** [facility] Prescription Fax = [phone]  * Please allow 3 business days for routine medication refills.  * Please allow 5 business days for controlled substance medication refills.     [facility] Test Result notification information:  *You will be notified with in 7-10 days of your appointment day regarding the results of your test.  If you are on MyChart you will be notified as soon as the provider has reviewed the results and signed off on them.    [facility]: [phone]

## 2019-06-05 ENCOUNTER — OFFICE VISIT (OUTPATIENT)
Dept: URGENT CARE | Facility: URGENT CARE | Age: 24
End: 2019-06-05
Payer: COMMERCIAL

## 2019-06-05 VITALS
OXYGEN SATURATION: 99 % | HEART RATE: 66 BPM | TEMPERATURE: 99.1 F | DIASTOLIC BLOOD PRESSURE: 84 MMHG | WEIGHT: 220 LBS | SYSTOLIC BLOOD PRESSURE: 121 MMHG

## 2019-06-05 DIAGNOSIS — N39.0 URINARY TRACT INFECTION WITH HEMATURIA, SITE UNSPECIFIED: Primary | ICD-10-CM

## 2019-06-05 DIAGNOSIS — R31.9 URINARY TRACT INFECTION WITH HEMATURIA, SITE UNSPECIFIED: Primary | ICD-10-CM

## 2019-06-05 DIAGNOSIS — R30.0 DYSURIA: ICD-10-CM

## 2019-06-05 LAB
ALBUMIN UR-MCNC: 30 MG/DL
APPEARANCE UR: CLEAR
BACTERIA #/AREA URNS HPF: ABNORMAL /HPF
BILIRUB UR QL STRIP: NEGATIVE
COLOR UR AUTO: YELLOW
GLUCOSE UR STRIP-MCNC: NEGATIVE MG/DL
HGB UR QL STRIP: ABNORMAL
KETONES UR STRIP-MCNC: NEGATIVE MG/DL
LEUKOCYTE ESTERASE UR QL STRIP: ABNORMAL
NITRATE UR QL: NEGATIVE
NON-SQ EPI CELLS #/AREA URNS LPF: ABNORMAL /LPF
PH UR STRIP: 6.5 PH (ref 5–7)
RBC #/AREA URNS AUTO: ABNORMAL /HPF
SOURCE: ABNORMAL
SP GR UR STRIP: 1.02 (ref 1–1.03)
UROBILINOGEN UR STRIP-ACNC: 1 EU/DL (ref 0.2–1)
WBC #/AREA URNS AUTO: ABNORMAL /HPF

## 2019-06-05 PROCEDURE — 81001 URINALYSIS AUTO W/SCOPE: CPT | Performed by: INTERNAL MEDICINE

## 2019-06-05 PROCEDURE — 87086 URINE CULTURE/COLONY COUNT: CPT | Performed by: PHYSICIAN ASSISTANT

## 2019-06-05 PROCEDURE — 99213 OFFICE O/P EST LOW 20 MIN: CPT | Performed by: PHYSICIAN ASSISTANT

## 2019-06-05 RX ORDER — SULFAMETHOXAZOLE/TRIMETHOPRIM 800-160 MG
1 TABLET ORAL 2 TIMES DAILY
Qty: 6 TABLET | Refills: 0 | Status: SHIPPED | OUTPATIENT
Start: 2019-06-05 | End: 2019-08-27

## 2019-06-05 NOTE — PROGRESS NOTES
HPI:  Pairs is a 22 yo female who presents for dysuria x 1 day. Denies blood in urine, fever, abdominal pain, flank pain or vaginal discharge.    She reports she has had 1 UTI in the past and this feels similar.      Uses IUD for birthcontrol    ROS:  See HPI      PE:  Vitals & nursing notes reviewed. B/P: 121/84, T: 99.1, P: 66, R: Data Unavailable  Constitutional:  Alert, well nourished, well-developed, NAD  Lungs:  CTA, no wheezes, rhonchi, or rales  CV:  RRR,  no murmur appreciated  No CVA tenderness      ASSESSMENT:  1. UTI  Plan:   Increase H2O intake.  Discussed signs & sx of pyelonephritis.  OTC pyridium PRN.  Void before and after sexual intercourse,  Wipe front to back after using restroom.  Avoid caffeine and alcohol as they are bladder irritants.  Use condoms during sex as antibx may reduce BCP's effectiveness.   F/U with PCP if sx persist or worsen.

## 2019-06-06 LAB
BACTERIA SPEC CULT: NORMAL
BACTERIA SPEC CULT: NORMAL
SPECIMEN SOURCE: NORMAL

## 2019-08-10 ENCOUNTER — OFFICE VISIT (OUTPATIENT)
Dept: URGENT CARE | Facility: URGENT CARE | Age: 24
End: 2019-08-10
Payer: COMMERCIAL

## 2019-08-10 VITALS
OXYGEN SATURATION: 99 % | SYSTOLIC BLOOD PRESSURE: 120 MMHG | HEART RATE: 66 BPM | WEIGHT: 220 LBS | DIASTOLIC BLOOD PRESSURE: 79 MMHG | TEMPERATURE: 98.2 F

## 2019-08-10 DIAGNOSIS — G43.019 INTRACTABLE MIGRAINE WITHOUT AURA AND WITHOUT STATUS MIGRAINOSUS: Primary | ICD-10-CM

## 2019-08-10 PROCEDURE — 96372 THER/PROPH/DIAG INJ SC/IM: CPT | Performed by: INTERNAL MEDICINE

## 2019-08-10 PROCEDURE — 99214 OFFICE O/P EST MOD 30 MIN: CPT | Mod: 25 | Performed by: INTERNAL MEDICINE

## 2019-08-10 RX ORDER — SUMATRIPTAN 6 MG/.5ML
6 INJECTION, SOLUTION SUBCUTANEOUS ONCE
Status: COMPLETED | OUTPATIENT
Start: 2019-08-10 | End: 2019-08-10

## 2019-08-10 RX ORDER — SUMATRIPTAN 50 MG/1
50 TABLET, FILM COATED ORAL
Qty: 9 TABLET | Refills: 0 | Status: SHIPPED | OUTPATIENT
Start: 2019-08-10 | End: 2021-08-31

## 2019-08-10 RX ORDER — KETOROLAC TROMETHAMINE 30 MG/ML
60 INJECTION, SOLUTION INTRAMUSCULAR; INTRAVENOUS ONCE
Status: COMPLETED | OUTPATIENT
Start: 2019-08-10 | End: 2019-08-10

## 2019-08-10 RX ORDER — ONDANSETRON 4 MG/1
4-8 TABLET, ORALLY DISINTEGRATING ORAL EVERY 8 HOURS PRN
Qty: 20 TABLET | Refills: 0 | Status: SHIPPED | OUTPATIENT
Start: 2019-08-10 | End: 2021-03-10

## 2019-08-10 RX ORDER — SUMATRIPTAN 50 MG/1
50 TABLET, FILM COATED ORAL
Qty: 9 TABLET | Refills: 0 | Status: CANCELLED | OUTPATIENT
Start: 2019-08-10

## 2019-08-10 RX ORDER — ONDANSETRON 4 MG/1
8 TABLET, ORALLY DISINTEGRATING ORAL ONCE
Status: COMPLETED | OUTPATIENT
Start: 2019-08-10 | End: 2019-08-10

## 2019-08-10 RX ORDER — ONDANSETRON 4 MG/1
4-8 TABLET, ORALLY DISINTEGRATING ORAL EVERY 8 HOURS PRN
Qty: 20 TABLET | Refills: 0 | Status: SHIPPED | OUTPATIENT
Start: 2019-08-10 | End: 2019-08-10

## 2019-08-10 RX ADMIN — KETOROLAC TROMETHAMINE 60 MG: 30 INJECTION, SOLUTION INTRAMUSCULAR; INTRAVENOUS at 12:42

## 2019-08-10 RX ADMIN — SUMATRIPTAN 6 MG: 6 INJECTION, SOLUTION SUBCUTANEOUS at 11:11

## 2019-08-10 RX ADMIN — ONDANSETRON 8 MG: 4 TABLET, ORALLY DISINTEGRATING ORAL at 11:09

## 2019-08-10 ASSESSMENT — ENCOUNTER SYMPTOMS
FEVER: 0
FATIGUE: 0
WEAKNESS: 0
EYE PAIN: 1
FACIAL ASYMMETRY: 0
NUMBNESS: 0
RHINORRHEA: 1
SPEECH DIFFICULTY: 0

## 2019-08-10 NOTE — PROGRESS NOTES
SUBJECTIVE:   Paris Reddy is a 23 year old female presenting with a chief complaint of   Chief Complaint   Patient presents with     Urgent Care     Headache     c/o HA for 1 day       She is an established patient of [facility].    Headace    Onset of symptoms was 1 hour(s) ago.  left side headache   Pressure sensation  typical for migraine  Onset age 19  Mother with migraines  Current and Associated symptoms: Headache, Nausea, Vomitting  Treatment measures tried include: excedrin  But threw up      Review of Systems   Constitutional: Negative for fatigue and fever.   HENT: Positive for rhinorrhea.    Eyes: Positive for pain. Negative for visual disturbance.        Left eye   Skin: Negative for rash.   Allergic/Immunologic: Positive for environmental allergies.   Neurological: Negative for syncope, facial asymmetry, speech difficulty, weakness and numbness.       Past Medical History:   Diagnosis Date     Migraines      Family History   Problem Relation Age of Onset     Cervical Cancer Mother         HPV+     Migraines Mother      Current Outpatient Medications   Medication Sig Dispense Refill     butalbital-acetaminophen-caffeine (FIORICET/ESGIC) -40 MG tablet Take 1 tablet by mouth       levonorgestrel (LILETTA, 52 MG,) 19.5 MCG/DAY IUD 1 each (19.5 mcg) by Intrauterine route once for 1 dose Due to remove in 2020       lisdexamfetamine (VYVANSE) 30 MG capsule Take 1 capsule (30 mg) by mouth every morning 30 capsule 0     ondansetron (ZOFRAN-ODT) 4 MG ODT tab Take 1-2 tablets (4-8 mg) by mouth every 8 hours as needed for nausea 20 tablet 0     SUMAtriptan (IMITREX) 50 MG tablet Take 1 tablet (50 mg) by mouth at onset of headache for migraine May repeat in 2 hours. Max 4 tablets/24 hours. 9 tablet 0     Social History     Tobacco Use     Smoking status: Never Smoker     Smokeless tobacco: Never Used   Substance Use Topics     Alcohol use: Yes     Frequency: 2-4 times a month       OBJECTIVE  /79    Pulse 66   Temp 98.2  F (36.8  C) (Oral)   Wt 99.8 kg (220 lb)   SpO2 99%     Physical Exam   Constitutional: She is oriented to person, place, and time. She appears well-developed and well-nourished.   Wearing sunglasses  Appears in pain   HENT:   Mouth/Throat: Oropharynx is clear and moist.   Eyes: Pupils are equal, round, and reactive to light. EOM are normal.   Abdominal: Soft.   Mild tenderness   Neurological: She is alert and oriented to person, place, and time. She displays normal reflexes. No cranial nerve deficit or sensory deficit. She exhibits normal muscle tone.   Psychiatric: She has a normal mood and affect.   Vitals reviewed.    Patient was given Imitrex 6 mg subcu and Zofran 8 mg sublingual.  Since she did not improve with Imitrex and Zofran she was given a shot of Toradol    Labs:  No results found for this or any previous visit (from the past 24 hour(s)).        ASSESSMENT:      ICD-10-CM    1. Intractable migraine without aura and without status migrainosus G43.019 SUMAtriptan (IMITREX) injection 6 mg     ondansetron (ZOFRAN-ODT) ODT tab 8 mg     INJECTION INTRAMUSCULAR OR SUB-Q     ketorolac (TORADOL) injection 60 mg     SUMAtriptan (IMITREX) 50 MG tablet     ondansetron (ZOFRAN-ODT) 4 MG ODT tab     DISCONTINUED: ondansetron (ZOFRAN-ODT) 4 MG ODT tab        Medical Decision Making:  Patient's headache is consistent with migraine.  She had no symptoms or exam findings of neurologic concern.  This is her first triptan dosing.  Subcu injection was chosen as she had emesis of the Excedrin Migraine medication.    PLAN:      Patient Instructions     Imitrex shot and Zofran given  Imitrex could be tried at onset of next headache versus 1 to 2 hours into it.  This may be helpful.  I also gave prescription for Zofran for nausea to help prevent vomiting next time with a headache    As you did not respond to the Imitrex and Zofran and Toradol shot was given                  Patient Education      Preventing Migraine Headaches: Triggers     Red wine is a common migraine trigger.   The first step in preventing migraines is to learn what triggers them. You may then be able to control your triggers to avoid or reduce the severity of your migraines.  Know your triggers  Be aware that you may have more than one trigger, and that some triggers may work together. Common migraine triggers include:    Food and nutrition. Skipping meals or not drinking enough water can trigger headaches. So can certain foods, such as caffeine, chocolate, artificial sweeteners, monosodium glutamate (MSG), aged cheese, or sausage.    Alcohol. Red wine and other alcoholic beverages are common migraine triggers.    Chemicals. Scents, cleaning products, gasoline, glue, perfume, and paint can be triggers. So can tobacco smoke, including secondhand smoke.    Emotions. Stress can trigger headaches or make them worse once they start.    Sleep disruption. Staying up late, sleeping late, and traveling across time zones can disrupt your sleep cycle, triggering headaches.    Hormones. Many women notice that migraines tend to happen at a certain point in their menstrual cycle. Birth control pills or hormone replacement therapy may also trigger migraines.    Environment and weather. Air travel, changes in altitude, air pressure changes, hot sun, or bright or flashing lights can be triggers.    Medicine overuse. Frequent use of pain medicines for headache pain can also cause a headache. This may also be called rebound headache.  Control your triggers  These are some of the things you can do to try to control triggers:    Avoid triggers if you can. For example, stay clear of alcohol and foods that trigger your headaches. Use unscented household products. Keep regular sleep habits. Manage stress to help control emotional triggers.    Change your behavior at times when triggers can't be avoided. For example, make sure to get enough rest and drink  plenty of water while you're traveling. Make sure to carry a hat, sunglasses, and your medicines. Be alert for migraine symptoms, so you can treat a migraine early if it happens.  Date Last Reviewed: 5/1/2018 2000-2018 [employer]. [address] [place] [address], [place] [address]. All rights reserved. This information is not intended as a substitute for professional medical care. Always follow your healthcare professional's instructions.           Patient Education     Migraine Headache  This often severe type of headache is different from other types of headaches in that symptoms other than pain occur with the headache. Nausea and vomiting, lightheadedness, sensitivity to light (photophobia), and other visual disturbances are common migraine symptoms. The pain may last from a few hours to several days. It is not clear why migraines occur but certain factors called  triggers  can raise the risk of having a migraine attack. A migraine may be triggered by emotional stress or depression, or by hormone changes during the menstrual cycle. Other triggers include birth control pills, overuse of migraine medicines, alcohol or caffeine, foods with tyramine (such as aged cheese and wine), eyestrain, weather changes, missed meals, or too little or too much sleep.  Home care  Follow these tips when taking care of yourself at home:    Don t drive yourself home if you were given pain medicine for your headache or are having visual symptoms. Instead, have someone else drive you home. Try to sleep when you get home. You should feel much better when you wake up.    Cold can help ease migraine symptoms. Put an ice pack on your forehead or at the base of your skull. Put heat on the back of your neck to help ease any neck spasm.    Drink only clear liquids or eat a light diet until your symptoms get better. This will help you avoid nausea and vomiting.  How to prevent migraines  Pay attention to what seems to trigger your  headache. Try to avoid the triggers when you can. If you have frequent headaches, consider keeping a headache diary. In it, write down what you were doing, feeling, or eating in the hours before each headache. Show this to your healthcare provider to help find the cause of your headaches.  If stress seems to be a trigger for your headaches, figure out what is causing stress in your life. Learn new ways to handle your stress. Ideas include regular exercise, biofeedback, self-hypnosis, yoga, and meditation. Talk with your healthcare provider to find out more information about managing stress. Many books and digital media are also available on this subject.  Tyramine is a substance found in many foods. It can trigger a migraine in some people. These foods contain tyramine:    Chocolate    Yogurt    All cheeses, but especially aged cheeses    Smoked or pickled fish and meat, including herring, caviar, bologna, pepperoni, and salami    Liver    Avocados    Bananas    Figs    Raisins    Red wine  Try staying away from these foods for 1 to 2 months to see if you have fewer headaches.  How to treat future headaches    Take time out at the first sign of a headache, if possible. Find a quiet, dark, comfortable place to sit or lie down. Let yourself relax or sleep.    Put an ice pack on your forehead or on the area of greatest pain. A heating pad and massage may help if you are having a muscle spasm and tightness in your neck.    If you have been prescribed a medicine to stop a migraine headache, use this at the first warning sign of the headache for best results. First signs may be an aura or pain.    If you need to take medicine often for your migraine, talk with your healthcare provider about other ways to prevent your headaches.  Follow-up care  Follow up with your healthcare provider, or as advised. Talk with your provider if you have frequent headaches. He or she can figure out a treatment plan. Ask if you can have  medicine to take at home the next time you get a bad headache. This may keep you from having to visit the emergency department in the future. You may need to see a headache specialist (neurologist) if you continue to have headaches.  When to seek medical advice  Call your healthcare provider right away if any of these occur:    Your head pain gets worse, or doesn t get better within 24 hours    You can t keep liquids down (repeated vomiting)    Pain in your sinuses, ears, or throat    Fever of 100.4  F (38  C) or higher, or as directed by your healthcare provider    Stiff neck    Extreme drowsiness, confusion, or fainting    Dizziness, or dizziness with spinning sensation (vertigo)    Weakness in an arm or leg, or on one side of your face    Difficulty talking or seeing  Date Last Reviewed: 8/1/2016 2000-2018 [employer]. [address] [place] [address], [place] [address]. All rights reserved. This information is not intended as a substitute for professional medical care. Always follow your healthcare professional's instructions.

## 2019-08-10 NOTE — PATIENT INSTRUCTIONS
Imitrex shot and Zofran given  Imitrex could be tried at onset of next headache versus 1 to 2 hours into it.  This may be helpful.  I also gave prescription for Zofran for nausea to help prevent vomiting next time with a headache    As you did not respond to the Imitrex and Zofran and Toradol shot was given                  Patient Education     Preventing Migraine Headaches: Triggers     Red wine is a common migraine trigger.   The first step in preventing migraines is to learn what triggers them. You may then be able to control your triggers to avoid or reduce the severity of your migraines.  Know your triggers  Be aware that you may have more than one trigger, and that some triggers may work together. Common migraine triggers include:    Food and nutrition. Skipping meals or not drinking enough water can trigger headaches. So can certain foods, such as caffeine, chocolate, artificial sweeteners, monosodium glutamate (MSG), aged cheese, or sausage.    Alcohol. Red wine and other alcoholic beverages are common migraine triggers.    Chemicals. Scents, cleaning products, gasoline, glue, perfume, and paint can be triggers. So can tobacco smoke, including secondhand smoke.    Emotions. Stress can trigger headaches or make them worse once they start.    Sleep disruption. Staying up late, sleeping late, and traveling across time zones can disrupt your sleep cycle, triggering headaches.    Hormones. Many women notice that migraines tend to happen at a certain point in their menstrual cycle. Birth control pills or hormone replacement therapy may also trigger migraines.    Environment and weather. Air travel, changes in altitude, air pressure changes, hot sun, or bright or flashing lights can be triggers.    Medicine overuse. Frequent use of pain medicines for headache pain can also cause a headache. This may also be called rebound headache.  Control your triggers  These are some of the things you can do to try to control  triggers:    Avoid triggers if you can. For example, stay clear of alcohol and foods that trigger your headaches. Use unscented household products. Keep regular sleep habits. Manage stress to help control emotional triggers.    Change your behavior at times when triggers can't be avoided. For example, make sure to get enough rest and drink plenty of water while you're traveling. Make sure to carry a hat, sunglasses, and your medicines. Be alert for migraine symptoms, so you can treat a migraine early if it happens.  Date Last Reviewed: 5/1/2018 2000-2018 [employer]. [address] [place] [address]. All rights reserved. This information is not intended as a substitute for professional medical care. Always follow your healthcare professional's instructions.           Patient Education     Migraine Headache  This often severe type of headache is different from other types of headaches in that symptoms other than pain occur with the headache. Nausea and vomiting, lightheadedness, sensitivity to light (photophobia), and other visual disturbances are common migraine symptoms. The pain may last from a few hours to several days. It is not clear why migraines occur but certain factors called  triggers  can raise the risk of having a migraine attack. A migraine may be triggered by emotional stress or depression, or by hormone changes during the menstrual cycle. Other triggers include birth control pills, overuse of migraine medicines, alcohol or caffeine, foods with tyramine (such as aged cheese and wine), eyestrain, weather changes, missed meals, or too little or too much sleep.  Home care  Follow these tips when taking care of yourself at home:    Don t drive yourself home if you were given pain medicine for your headache or are having visual symptoms. Instead, have someone else drive you home. Try to sleep when you get home. You should feel much better when you wake up.    Cold can help ease  migraine symptoms. Put an ice pack on your forehead or at the base of your skull. Put heat on the back of your neck to help ease any neck spasm.    Drink only clear liquids or eat a light diet until your symptoms get better. This will help you avoid nausea and vomiting.  How to prevent migraines  Pay attention to what seems to trigger your headache. Try to avoid the triggers when you can. If you have frequent headaches, consider keeping a headache diary. In it, write down what you were doing, feeling, or eating in the hours before each headache. Show this to your healthcare provider to help find the cause of your headaches.  If stress seems to be a trigger for your headaches, figure out what is causing stress in your life. Learn new ways to handle your stress. Ideas include regular exercise, biofeedback, self-hypnosis, yoga, and meditation. Talk with your healthcare provider to find out more information about managing stress. Many books and digital media are also available on this subject.  Tyramine is a substance found in many foods. It can trigger a migraine in some people. These foods contain tyramine:    Chocolate    Yogurt    All cheeses, but especially aged cheeses    Smoked or pickled fish and meat, including herring, caviar, bologna, pepperoni, and salami    Liver    Avocados    Bananas    Figs    Raisins    Red wine  Try staying away from these foods for 1 to 2 months to see if you have fewer headaches.  How to treat future headaches    Take time out at the first sign of a headache, if possible. Find a quiet, dark, comfortable place to sit or lie down. Let yourself relax or sleep.    Put an ice pack on your forehead or on the area of greatest pain. A heating pad and massage may help if you are having a muscle spasm and tightness in your neck.    If you have been prescribed a medicine to stop a migraine headache, use this at the first warning sign of the headache for best results. First signs may be an aura  or pain.    If you need to take medicine often for your migraine, talk with your healthcare provider about other ways to prevent your headaches.  Follow-up care  Follow up with your healthcare provider, or as advised. Talk with your provider if you have frequent headaches. He or she can figure out a treatment plan. Ask if you can have medicine to take at home the next time you get a bad headache. This may keep you from having to visit the emergency department in the future. You may need to see a headache specialist (neurologist) if you continue to have headaches.  When to seek medical advice  Call your healthcare provider right away if any of these occur:    Your head pain gets worse, or doesn t get better within 24 hours    You can t keep liquids down (repeated vomiting)    Pain in your sinuses, ears, or throat    Fever of 100.4  F (38  C) or higher, or as directed by your healthcare provider    Stiff neck    Extreme drowsiness, confusion, or fainting    Dizziness, or dizziness with spinning sensation (vertigo)    Weakness in an arm or leg, or on one side of your face    Difficulty talking or seeing  Date Last Reviewed: 8/1/2016 2000-2018 [employer]. [address] [place] [address]. All rights reserved. This information is not intended as a substitute for professional medical care. Always follow your healthcare professional's instructions.

## 2019-08-27 ENCOUNTER — OFFICE VISIT (OUTPATIENT)
Dept: INTERNAL MEDICINE | Facility: CLINIC | Age: 24
End: 2019-08-27
Payer: COMMERCIAL

## 2019-08-27 VITALS
HEART RATE: 77 BPM | SYSTOLIC BLOOD PRESSURE: 121 MMHG | WEIGHT: 219.2 LBS | DIASTOLIC BLOOD PRESSURE: 84 MMHG | OXYGEN SATURATION: 98 %

## 2019-08-27 DIAGNOSIS — F41.9 ANXIETY: ICD-10-CM

## 2019-08-27 DIAGNOSIS — F50.819 BINGE EATING DISORDER: Primary | ICD-10-CM

## 2019-08-27 RX ORDER — LISDEXAMFETAMINE DIMESYLATE 30 MG/1
30 CAPSULE ORAL EVERY MORNING
Qty: 30 CAPSULE | Refills: 0 | Status: SHIPPED | OUTPATIENT
Start: 2019-10-27 | End: 2019-12-04

## 2019-08-27 RX ORDER — LISDEXAMFETAMINE DIMESYLATE 30 MG/1
30 CAPSULE ORAL DAILY
Qty: 30 CAPSULE | Refills: 0 | Status: SHIPPED | OUTPATIENT
Start: 2019-08-27 | End: 2019-12-04

## 2019-08-27 RX ORDER — LISDEXAMFETAMINE DIMESYLATE 30 MG/1
30 CAPSULE ORAL DAILY
Qty: 30 CAPSULE | Refills: 0 | Status: SHIPPED | OUTPATIENT
Start: 2019-09-27 | End: 2019-12-04

## 2019-08-27 ASSESSMENT — PATIENT HEALTH QUESTIONNAIRE - PHQ9
5. POOR APPETITE OR OVEREATING: NOT AT ALL
SUM OF ALL RESPONSES TO PHQ QUESTIONS 1-9: 6

## 2019-08-27 ASSESSMENT — ANXIETY QUESTIONNAIRES
IF YOU CHECKED OFF ANY PROBLEMS ON THIS QUESTIONNAIRE, HOW DIFFICULT HAVE THESE PROBLEMS MADE IT FOR YOU TO DO YOUR WORK, TAKE CARE OF THINGS AT HOME, OR GET ALONG WITH OTHER PEOPLE: SOMEWHAT DIFFICULT
7. FEELING AFRAID AS IF SOMETHING AWFUL MIGHT HAPPEN: SEVERAL DAYS
1. FEELING NERVOUS, ANXIOUS, OR ON EDGE: SEVERAL DAYS
2. NOT BEING ABLE TO STOP OR CONTROL WORRYING: NOT AT ALL
3. WORRYING TOO MUCH ABOUT DIFFERENT THINGS: SEVERAL DAYS
GAD7 TOTAL SCORE: 3
6. BECOMING EASILY ANNOYED OR IRRITABLE: NOT AT ALL
5. BEING SO RESTLESS THAT IT IS HARD TO SIT STILL: NOT AT ALL

## 2019-08-27 ASSESSMENT — PAIN SCALES - GENERAL: PAINLEVEL: NO PAIN (0)

## 2019-08-27 NOTE — NURSING NOTE
Chief Complaint   Patient presents with     Medication Request     Pt here for medication recheck      Ольга Segura, EMT at 4:59 PM sign on 8/27/2019

## 2019-08-28 ASSESSMENT — ANXIETY QUESTIONNAIRES: GAD7 TOTAL SCORE: 3

## 2019-09-11 ENCOUNTER — OFFICE VISIT (OUTPATIENT)
Dept: BEHAVIORAL HEALTH | Facility: CLINIC | Age: 24
End: 2019-09-11
Payer: COMMERCIAL

## 2019-09-11 DIAGNOSIS — F43.22 ADJUSTMENT DISORDER WITH ANXIOUS MOOD: ICD-10-CM

## 2019-09-11 DIAGNOSIS — F33.1 MAJOR DEPRESSIVE DISORDER, RECURRENT EPISODE, MODERATE (H): Primary | ICD-10-CM

## 2019-09-11 ASSESSMENT — PATIENT HEALTH QUESTIONNAIRE - PHQ9
SUM OF ALL RESPONSES TO PHQ QUESTIONS 1-9: 9
SUM OF ALL RESPONSES TO PHQ QUESTIONS 1-9: 9

## 2019-09-11 ASSESSMENT — ANXIETY QUESTIONNAIRES
3. WORRYING TOO MUCH ABOUT DIFFERENT THINGS: SEVERAL DAYS
4. TROUBLE RELAXING: SEVERAL DAYS
GAD7 TOTAL SCORE: 6
7. FEELING AFRAID AS IF SOMETHING AWFUL MIGHT HAPPEN: SEVERAL DAYS
GAD7 TOTAL SCORE: 6
6. BECOMING EASILY ANNOYED OR IRRITABLE: SEVERAL DAYS
7. FEELING AFRAID AS IF SOMETHING AWFUL MIGHT HAPPEN: SEVERAL DAYS
1. FEELING NERVOUS, ANXIOUS, OR ON EDGE: SEVERAL DAYS
5. BEING SO RESTLESS THAT IT IS HARD TO SIT STILL: NOT AT ALL
GAD7 TOTAL SCORE: 6
2. NOT BEING ABLE TO STOP OR CONTROL WORRYING: SEVERAL DAYS
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
## 2019-09-12 ASSESSMENT — ANXIETY QUESTIONNAIRES: GAD7 TOTAL SCORE: 6

## 2019-09-12 ASSESSMENT — PATIENT HEALTH QUESTIONNAIRE - PHQ9: SUM OF ALL RESPONSES TO PHQ QUESTIONS 1-9: 9

## 2019-09-20 DIAGNOSIS — F33.0 MILD EPISODE OF RECURRENT MAJOR DEPRESSIVE DISORDER (H): Primary | ICD-10-CM

## 2019-09-20 RX ORDER — SERTRALINE HYDROCHLORIDE 25 MG/1
25 TABLET, FILM COATED ORAL DAILY
Qty: 60 TABLET | Refills: 1 | Status: SHIPPED | OUTPATIENT
Start: 2019-09-20 | End: 2019-12-04

## 2019-09-20 NOTE — PROGRESS NOTES
Pt requests to restart Sertraline for mood. Will send Rx to [facility]. F/u in 4-6 weeks.  SHAKIRA Pulido CNP

## 2019-09-26 ENCOUNTER — OFFICE VISIT (OUTPATIENT)
Dept: BEHAVIORAL HEALTH | Facility: CLINIC | Age: 24
End: 2019-09-26
Payer: COMMERCIAL

## 2019-09-26 DIAGNOSIS — F32.1 MAJOR DEPRESSIVE DISORDER, SINGLE EPISODE, MODERATE (H): Primary | ICD-10-CM

## 2019-09-26 ASSESSMENT — COLUMBIA-SUICIDE SEVERITY RATING SCALE - C-SSRS
5. HAVE YOU STARTED TO WORK OUT OR WORKED OUT THE DETAILS OF HOW TO KILL YOURSELF? DO YOU INTEND TO CARRY OUT THIS PLAN?: NO
2. HAVE YOU ACTUALLY HAD ANY THOUGHTS OF KILLING YOURSELF LIFETIME?: YES
ATTEMPT PAST THREE MONTHS: NO
3. HAVE YOU BEEN THINKING ABOUT HOW YOU MIGHT KILL YOURSELF?: NO
6. HAVE YOU EVER DONE ANYTHING, STARTED TO DO ANYTHING, OR PREPARED TO DO ANYTHING TO END YOUR LIFE?: NO
1. IN THE PAST MONTH, HAVE YOU WISHED YOU WERE DEAD OR WISHED YOU COULD GO TO SLEEP AND NOT WAKE UP?: YES
2. HAVE YOU ACTUALLY HAD ANY THOUGHTS OF KILLING YOURSELF?: NO
4. HAVE YOU HAD THESE THOUGHTS AND HAD SOME INTENTION OF ACTING ON THEM?: NO
TOTAL  NUMBER OF ABORTED OR SELF INTERRUPTED ATTEMPTS PAST 3 MONTHS: NO
TOTAL  NUMBER OF INTERRUPTED ATTEMPTS LIFETIME: NO
6. HAVE YOU EVER DONE ANYTHING, STARTED TO DO ANYTHING, OR PREPARED TO DO ANYTHING TO END YOUR LIFE?: NO
TOTAL  NUMBER OF INTERRUPTED ATTEMPTS PAST 3 MONTHS: NO
4. HAVE YOU HAD THESE THOUGHTS AND HAD SOME INTENTION OF ACTING ON THEM?: NO
5. HAVE YOU STARTED TO WORK OUT OR WORKED OUT THE DETAILS OF HOW TO KILL YOURSELF? DO YOU INTEND TO CARRY OUT THIS PLAN?: NO
ATTEMPT LIFETIME: NO
1. IN THE PAST MONTH, HAVE YOU WISHED YOU WERE DEAD OR WISHED YOU COULD GO TO SLEEP AND NOT WAKE UP?: NO
TOTAL  NUMBER OF ABORTED OR SELF INTERRUPTED ATTEMPTS PAST LIFETIME: NO
REASONS FOR IDEATION LIFETIME: DOES NOT APPLY
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
## 2019-10-08 ENCOUNTER — OFFICE VISIT (OUTPATIENT)
Dept: BEHAVIORAL HEALTH | Facility: CLINIC | Age: 24
End: 2019-10-08
Payer: COMMERCIAL

## 2019-10-08 DIAGNOSIS — F43.22 ADJUSTMENT DISORDER WITH ANXIOUS MOOD: ICD-10-CM

## 2019-10-08 DIAGNOSIS — F33.1 MAJOR DEPRESSIVE DISORDER, RECURRENT EPISODE, MODERATE (H): Primary | ICD-10-CM
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
## 2019-10-22 ENCOUNTER — OFFICE VISIT (OUTPATIENT)
Dept: BEHAVIORAL HEALTH | Facility: CLINIC | Age: 24
End: 2019-10-22
Payer: COMMERCIAL

## 2019-10-22 DIAGNOSIS — F33.1 MAJOR DEPRESSIVE DISORDER, RECURRENT EPISODE, MODERATE (H): Primary | ICD-10-CM
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
## 2019-11-15 ENCOUNTER — TELEPHONE (OUTPATIENT)
Dept: INTERNAL MEDICINE | Facility: CLINIC | Age: 24
End: 2019-11-15

## 2019-11-15 DIAGNOSIS — M26.609 TMJ (TEMPOROMANDIBULAR JOINT SYNDROME): Primary | ICD-10-CM

## 2019-11-15 NOTE — TELEPHONE ENCOUNTER
Referral placed to [facility] per pt request.   SHAKIRA Pulido CNP    ----- Message from Paris Reddy LPN sent at 11/15/2019  8:34 AM CST -----  Regarding: referral request  Brenton Licona,     Yesterday I had a dentist appointment and they let me know that I have been clenching my teeth at night. They asked me if I had headaches which I have been intermittently. It had not really been bothering me that bad but they said if I continue without treatment that I could possibly crack some teeth or make the headaches worse/ happen more often. Would you be willing to refer me to TMJ clinic? I would be happy to discuss this further at my upcoming physical with you.     Paris

## 2019-11-20 ENCOUNTER — OFFICE VISIT (OUTPATIENT)
Dept: BEHAVIORAL HEALTH | Facility: CLINIC | Age: 24
End: 2019-11-20
Payer: COMMERCIAL

## 2019-11-20 DIAGNOSIS — F43.22 ADJUSTMENT DISORDER WITH ANXIOUS MOOD: ICD-10-CM

## 2019-11-20 DIAGNOSIS — F33.1 MAJOR DEPRESSIVE DISORDER, RECURRENT EPISODE, MODERATE (H): Primary | ICD-10-CM

## 2019-11-20 NOTE — PROGRESS NOTES
[facility] (PCC referral)  November 20, 2019        Behavioral Health Clinician Progress Note    Patient Name: Paris Reddy           Service Type:  Individual      Service Location:   Face to Face in Clinic     Session Start Time: 320pm  Session End Time: 4pm      Session Length: 38 - 52      Attendees: Patient    Visit Activities (Refresh list every visit): [facility] Only    Diagnostic Assessment Date: 9/26/2019  Treatment Plan Review Date: 9/26/2019   CGI-S: 5    See Flowsheets for today's PHQ-9 and ANGUS-7 results  Previous PHQ-9:   PHQ-9 SCORE 2/8/2019 8/27/2019 9/11/2019   PHQ-9 Total Score MyChart - - 9 (Mild depression)   PHQ-9 Total Score 9 6 9     Previous ANGUS-7:   ANGUS-7 SCORE 8/27/2019 9/11/2019   Total Score - 6 (mild anxiety)   Total Score 3 6       PITER LEVEL:  No flowsheet data found.    DATA  Extended Session (60+ minutes): No  Interactive Complexity: No  Crisis: No  [facility] Patient: No    Treatment Objective(s) Addressed in This Session:  Target Behavior(s): disease management/lifestyle changes      Patient  will experience a reduction in depressed mood, will develop more effective coping skills to manage depressive symptoms, will develop healthy cognitive patterns and beliefs and will increase ability to function adaptively, will experience a reduction in anxiety, will develop more effective coping skills to manage anxiety symptoms, will develop healthy cognitive patterns and beliefs and will increase ability to function adaptively and will develop coping/problem-solving skills to facilitate more adaptive adjustment    Current Stressors / Issues:  [facility] met with Paris to provide psychotherapy support regarding anxiety and depression, life stress.    Missy is stressed today and we discussed her new concerns. Her mom had car/arrest issue that is having multiple results for Missy. It is impacting cars and use of cars in the family, which is impacting work for Missy possibly.  Money is also coming into this, since she had to help bail her mom out. All of this also resonates with Missy's chaotic history/development and upbrining - many emotions are triggered as a result. Work stress because she was out - some concern about resentment there since she has been out so much.    Solution-focused and CBT work today.  They do have a  for her mom - might be able to address some of the concerns     Affirmed all the steps she has taken, how well she has handled all of this    I affirmed the steps this patient has taken to address physical and behavioral health issues, and offered continued behavioral health services or referral, now or in the future, as needed by the patient.    Progress on Treatment Objective(s) / Homework:  Satisfactory progress - ACTION (Actively working towards change); Intervened by reinforcing change plan / affirming steps taken    Interventions drawn from:  Psycho-education regarding mental health diagnoses and treatment options    Motivational Interviewing    Skills training    Explored specific skills useful to client in current situation    Skill areas include assertiveness, communication, conflict management, problem-solving, relaxation, etc.     Solution-Focused Therapy    Explored patterns in patient's behaviors and relationships and discussed options for new behaviors    Explored new options for problem-solving, communication, managing stress, etc.    Cognitive-behavioral Therapy    Discussed common cognitive distortions, identified them in patient's life    Explored ways to challenge, replace, and act against these cognitions    Explore behavioral changes that might benefit patient in improving mood and engage in meaningful activity    Acceptance and Commitment Therapy    Explored and identified important values in patient's life    Discussed ways to commit to behavioral activation around these values    Psychodynamic psychotherapy    Discussed patient's  emotional dynamics and issues and how they impact behaviors    Explored patient's history of relationships and how they impact present behaviors    Explored how to work with and make changes in these schemas and patterns    Narrative Therapy    Explored the patient's story of their life from their perspective,     Explored alternate ways of understanding their experience, identifying exceptions, developing new themes    Existential Psychotherapy    Explored the patient's experience of an concerns about existential concerns: meaning, freedom, purpose in life, life and death, etc,     Explored systems of meaning, methods for exploring these questions, resources for discussion, etc.    Interpersonal Psychotherapy    Explored patterns in relationships that are effective or ineffective at helping patient reach their goals, find satisfying experience.    Discussed new patterns or behaviors to engage in for improved social functioning.    Behavioral Activation    Discussed steps patient can take to become more involved in meaningful activity    Identified barriers to these activities and explored possible solutions    Mindfulness-Based Strategies    Discussed skills based on development and application of mindfulness    Skills drawn from compassion-focused therapy, dialectical behavior therapy, mindfulness-based stress reduction, mindfulness-based cognitive therapy, etc.    Medication Review:  No problems reported to [facility] today.    Medication Compliance:  No problems reported to [facility] today.    Changes in Health Issues:  No problems reported to [facility] today.    Chemical Use Review:   Substance Use: Chemical use reviewed, no active concerns identified      Tobacco Use: No current tobacco use.      Mental Status Assessment:  Appearance:   Appropriate   Eye Contact:   Good   Psychomotor Behavior: Normal   Attitude:   Cooperative   Orientation:   All  Speech   Rate / Production: Normal    Volume:  Normal   Mood:    Anxious   Depressed - improved lately, per her report  Affect:    Appropriate  Subdued   Thought Content:  Clear  Rumination   Thought Form:  Coherent  Logical   Insight:    Good     Safety Assessment:  Patient has had a history of suicidal ideation: some passive thoughts when she was younger - no plans, no intent, no attempts  Patient denies current or recent suicidal ideation or behaviors.  Patient denies current or recent homicidal ideation or behaviors.  Patient denies current or recent self injurious behavior or ideation.  Patient denies other safety concerns.  Patient reports there are no firearms in the house  Protective Factors Life Satisfaction, Reality testing ability, Positive coping skills, Positive problem-solving skills and Positive social support   Risk Factors Current high stress    Plan for Safety and Risk Management:  A safety and risk management plan has not been developed at this time, however patient was encouraged to call [place] [phone] should there be a change in any of these risk factors.    Diagnoses:  1. Major depressive disorder, recurrent episode, moderate (H)    2. Adjustment disorder with anxious mood    Consider developmental PTSD, ANGUS, eating disorder    Collateral Reports Completed:  Will collaborate with care team as indicated during treatment    Plan: (Homework, other):  Patient was given information about behavioral services and encouraged to schedule a follow up appointment with the clinic [facility] as needed.  She was also given information about mental health symptoms and treatment options  and Cognitive Behavioral Therapy skills to practice when experiencing anxiety and depression.  CD Recommendations: No indications of CD issues. We agree to begin a course of psychotherapy to address anxiety, depression, stress.  KATHERINE Neal, [facility]    ______________________________________________________________________    [facility] Integrated Behavioral Health Treatment Plan    Client's Name: Paris GLASER  Barry  YOB: 1995    Date: 9/26/2019    DSM5 Diagnoses: (Sustained by DSM5 Criteria Listed Above)  Diagnoses: 296.22 (F32.1)  Major Depressive Disorder, Single Episode, Moderate With anxious distress  Psychosocial & Contextual Factors: social stressors, possible hx of childhood trauma  WHODAS Score: 20  See Media section of EPIC medical record for completed WHODAS  CGI-S: 5    Referral / Collaboration:  Will collaborate with care team as indicated during treatment.    Anticipated number of session or this episode of care: 5-10      MeasurableTreatment Goal(s) related to diagnosis / functional impairment(s)  Goal 1:  Patient will experience a reduction in depressive and anxious symptoms, along with a corresponding increase in positive emotion and life satisfaction.    Objective #A: Patient will experience a reduction in depressed mood, will develop more effective coping skills to manage depressive symptoms, will develop healthy cognitive patterns and beliefs, will increase ability to function adaptively and will continue to take medications as prescribed / participate in supportive activities and services    Status: Continued - Date(s): 9/26/2019    Objective #B: Patient will experience a reduction in anxiety, will develop more effective coping skills to manage anxiety symptoms, will develop healthy cognitive patterns and beliefs and will increase ability to function adaptively  Status: Continued - Date(s): 9/26/2019    Objective #C: Patient will develop better understanding of triggers and coping strategies to stabilize mood  Status: Continued - Date(s): 9/26/2019    Goal 2:  Patient will identify and increase engagement in valued activity, i.e. improving social connections/relationships, pursuing occupational goals or personally meaningful pursuits, exploration of meaning in life.     Objective #A: Patient will identify meaningful activity in social, occupational and  personal goals, and increase  behavioral activation around these goals   Status: Continued - Date(s): 9/26/2019    Objective #B: Patient will address relationship difficulties in a more adaptive manner  Status: Continued - Date(s): 9/26/2019    Objective #C: Patient will develop coping/problem-solving skills to facilitate more adaptive adjustment and will effectively address problems that interfere with adaptive functioning  Status: Continued - Date(s): 9/26/2019    Possible Therapeutic Intervention(s)  Psycho-education regarding mental health diagnoses and treatment options    Eye-Movement Desensitization and Reprocessing Therapy    Clinical Hypnosis    Skills training    Explore skills useful to client in current situation.    Skills include assertiveness, communication, conflict management, problem-solving, relaxation, etc.    Solution-Focused Therapy    Explore patterns in patient's relationships and discuss options for new behaviors.    Explore patterns in patient's actions and choices and discuss options for new behaviors.    Cognitive-behavioral Therapy    Discuss common cognitive distortions, identify them in patient's life.    Explore ways to challenge, replace, and act against these cognitions.    Acceptance and Commitment Therapy    Explore and identify important values in patient's life.    Discuss ways to commit to behavioral activation around these values.    Psychodynamic psychotherapy    Discuss patient's emotional dynamics and issues and how they impact behaviors.    Explore patient's history of relationships and how they impact present behaviors.    Explore how to work with and make changes in these schemas and patterns.    Narrative Therapy    Explore the patient's story of his/her life from his/her perspective.    Explore alternate ways of understanding their experience, identifying exceptions, developing new themes.    Interpersonal Psychotherapy    Explore patterns in relationships that are effective or ineffective at  helping patient reach their goals, find satisfying experience.    Discuss new patterns or behaviors to engage in for improved social functioning.    Behavioral Activation    Discuss steps patient can take to become more involved in meaningful activity.    Identify barriers to these activities and explore possible solutions.    Mindfulness-Based Strategies    Discuss skills based on development and application of mindfulness.    Skills drawn from compassion-focused therapy, dialectical behavior therapy, mindfulness-based stress reduction, mindfulness-based cognitive therapy, etc.      We have developed these goals together during our work to this point. Patient has assisted in the development of these goals and has agreed to this treatment plan.       KATHERINE French, [facility]  September 26, 2019

## 2019-12-03 ENCOUNTER — OFFICE VISIT (OUTPATIENT)
Dept: BEHAVIORAL HEALTH | Facility: CLINIC | Age: 24
End: 2019-12-03
Payer: COMMERCIAL

## 2019-12-03 DIAGNOSIS — F33.1 MAJOR DEPRESSIVE DISORDER, RECURRENT EPISODE, MODERATE (H): Primary | ICD-10-CM

## 2019-12-03 DIAGNOSIS — F43.22 ADJUSTMENT DISORDER WITH ANXIOUS MOOD: ICD-10-CM

## 2019-12-03 NOTE — PROGRESS NOTES
"[facility] (PCC referral)  December 3, 2019        Behavioral Health Clinician Progress Note    Patient Name: Paris Reddy           Service Type:  Individual      Service Location:   Face to Face in Clinic     Session Start Time: 105pm  Session End Time: 2pm      Session Length: 53 - 60      Attendees: Patient    Visit Activities (Refresh list every visit): [facility] Only    Diagnostic Assessment Date: 9/26/2019  Treatment Plan Review Date: 9/26/2019   CGI-S: 5    See Flowsheets for today's PHQ-9 and ANGUS-7 results  Previous PHQ-9:   PHQ-9 SCORE 2/8/2019 8/27/2019 9/11/2019   PHQ-9 Total Score MyChart - - 9 (Mild depression)   PHQ-9 Total Score 9 6 9     Previous ANGUS-7:   ANGUS-7 SCORE 8/27/2019 9/11/2019   Total Score - 6 (mild anxiety)   Total Score 3 6       PITER LEVEL:  No flowsheet data found.    DATA  Extended Session (60+ minutes): No  Interactive Complexity: No  Crisis: No  [facility] Patient: No    Treatment Objective(s) Addressed in This Session:  Target Behavior(s): disease management/lifestyle changes      Patient  will experience a reduction in depressed mood, will develop more effective coping skills to manage depressive symptoms, will develop healthy cognitive patterns and beliefs and will increase ability to function adaptively, will experience a reduction in anxiety, will develop more effective coping skills to manage anxiety symptoms, will develop healthy cognitive patterns and beliefs and will increase ability to function adaptively and will develop coping/problem-solving skills to facilitate more adaptive adjustment    Current Stressors / Issues:  [facility] met with Paris to provide psychotherapy support regarding anxiety and depression, life stress.    Processed some recent difficulties. She reports on some newer stresses with a friend of a friend coming onto her at a party - hard on its own AND triggering of older memories. She felt trapped - felt \"hunted\" - but not able to speak her " mind, say anything. Discussed her question about if she is afraid of men.  Discussed trust in men, caution in our culture, women's experience.     Processed her thoughts and feelings about the event, what to do moving forward. 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 Explored possible solutions to this challenge.    She reports feeling less anxious and ruminative since starting with the CBT and having our conversations. We continued our conversation about the ideas form CBT and other approaches, to assist her in working with her current stressors.     I affirmed the steps this patient has taken to address physical and behavioral health issues, and offered continued behavioral health services or referral, now or in the future, as needed by the patient.    Progress on Treatment Objective(s) / Homework:  Satisfactory progress - ACTION (Actively working towards change); Intervened by reinforcing change plan / affirming steps taken    Interventions drawn from:  Psycho-education regarding mental health diagnoses and treatment options    Motivational Interviewing    Skills training    Explored specific skills useful to client in current situation    Skill areas include assertiveness, communication, conflict management, problem-solving, relaxation, etc.     Solution-Focused Therapy    Explored patterns in patient's behaviors and relationships and discussed options for new behaviors    Explored new options for problem-solving, communication, managing stress, etc.    Cognitive-behavioral Therapy    Discussed common cognitive distortions, identified them in patient's life    Explored ways to challenge, replace, and act against these cognitions    Explore behavioral changes that might benefit patient in improving mood and engage in meaningful activity    Acceptance and Commitment Therapy    Explored  and identified important values in patient's life    Discussed ways to commit to behavioral activation around these values    Psychodynamic psychotherapy    Discussed patient's emotional dynamics and issues and how they impact behaviors    Explored patient's history of relationships and how they impact present behaviors    Explored how to work with and make changes in these schemas and patterns    Narrative Therapy    Explored the patient's story of their life from their perspective,     Explored alternate ways of understanding their experience, identifying exceptions, developing new themes    Existential Psychotherapy    Explored the patient's experience of an concerns about existential concerns: meaning, freedom, purpose in life, life and death, etc,     Explored systems of meaning, methods for exploring these questions, resources for discussion, etc.    Interpersonal Psychotherapy    Explored patterns in relationships that are effective or ineffective at helping patient reach their goals, find satisfying experience.    Discussed new patterns or behaviors to engage in for improved social functioning.    Behavioral Activation    Discussed steps patient can take to become more involved in meaningful activity    Identified barriers to these activities and explored possible solutions    Mindfulness-Based Strategies    Discussed skills based on development and application of mindfulness    Skills drawn from compassion-focused therapy, dialectical behavior therapy, mindfulness-based stress reduction, mindfulness-based cognitive therapy, etc.    Medication Review:  No problems reported to [facility] today.    Medication Compliance:  No problems reported to [facility] today.    Changes in Health Issues:  No problems reported to [facility] today.    Chemical Use Review:   Substance Use: Chemical use reviewed, no active concerns identified      Tobacco Use: No current tobacco use.      Mental Status Assessment:  Appearance:   Appropriate   Eye  Contact:   Good   Psychomotor Behavior: Normal   Attitude:   Cooperative   Orientation:   All  Speech   Rate / Production: Normal    Volume:  Normal   Mood:    Anxious  Depressed - improved lately, per her report  Affect:    Appropriate  Subdued   Thought Content:  Clear  Rumination   Thought Form:  Coherent  Logical   Insight:    Good     Safety Assessment:  Patient has had a history of suicidal ideation: some passive thoughts when she was younger - no plans, no intent, no attempts  Patient denies current or recent suicidal ideation or behaviors.  Patient denies current or recent homicidal ideation or behaviors.  Patient denies current or recent self injurious behavior or ideation.  Patient denies other safety concerns.  Patient reports there are no firearms in the house  Protective Factors Life Satisfaction, Reality testing ability, Positive coping skills, Positive problem-solving skills and Positive social support   Risk Factors Current high stress    Plan for Safety and Risk Management:  A safety and risk management plan has not been developed at this time, however patient was encouraged to call [place] [phone] should there be a change in any of these risk factors.    Diagnoses:  1. Major depressive disorder, recurrent episode, moderate (H)    2. Adjustment disorder with anxious mood    Consider developmental PTSD, ANGUS, eating disorder    Collateral Reports Completed:  Will collaborate with care team as indicated during treatment    Plan: (Homework, other):  Patient was given information about behavioral services and encouraged to schedule a follow up appointment with the clinic [facility] as needed.  She was also given information about mental health symptoms and treatment options  and Cognitive Behavioral Therapy skills to practice when experiencing anxiety and depression.  CD Recommendations: No indications of CD issues. We agree to begin a course of psychotherapy to address anxiety, depression, stress.  Chucho  Haider, TLFT, [facility]    ______________________________________________________________________    [facility] Integrated Behavioral Health Treatment Plan    Client's Name: Paris Reddy  YOB: 1995    Date: 9/26/2019    DSM5 Diagnoses: (Sustained by DSM5 Criteria Listed Above)  Diagnoses: 296.22 (F32.1)  Major Depressive Disorder, Single Episode, Moderate With anxious distress  Psychosocial & Contextual Factors: social stressors, possible hx of childhood trauma  WHODAS Score: 20  See Media section of EPIC medical record for completed WHODAS  CGI-S: 5    Referral / Collaboration:  Will collaborate with care team as indicated during treatment.    Anticipated number of session or this episode of care: 5-10      MeasurableTreatment Goal(s) related to diagnosis / functional impairment(s)  Goal 1:  Patient will experience a reduction in depressive and anxious symptoms, along with a corresponding increase in positive emotion and life satisfaction.    Objective #A: Patient will experience a reduction in depressed mood, will develop more effective coping skills to manage depressive symptoms, will develop healthy cognitive patterns and beliefs, will increase ability to function adaptively and will continue to take medications as prescribed / participate in supportive activities and services    Status: Continued - Date(s): 9/26/2019    Objective #B: Patient will experience a reduction in anxiety, will develop more effective coping skills to manage anxiety symptoms, will develop healthy cognitive patterns and beliefs and will increase ability to function adaptively  Status: Continued - Date(s): 9/26/2019    Objective #C: Patient will develop better understanding of triggers and coping strategies to stabilize mood  Status: Continued - Date(s): 9/26/2019    Goal 2:  Patient will identify and increase engagement in valued activity, i.e. improving social connections/relationships, pursuing occupational goals or personally  meaningful pursuits, exploration of meaning in life.     Objective #A: Patient will identify meaningful activity in social, occupational and  personal goals, and increase behavioral activation around these goals   Status: Continued - Date(s): 9/26/2019    Objective #B: Patient will address relationship difficulties in a more adaptive manner  Status: Continued - Date(s): 9/26/2019    Objective #C: Patient will develop coping/problem-solving skills to facilitate more adaptive adjustment and will effectively address problems that interfere with adaptive functioning  Status: Continued - Date(s): 9/26/2019    Possible Therapeutic Intervention(s)  Psycho-education regarding mental health diagnoses and treatment options    Eye-Movement Desensitization and Reprocessing Therapy    Clinical Hypnosis    Skills training    Explore skills useful to client in current situation.    Skills include assertiveness, communication, conflict management, problem-solving, relaxation, etc.    Solution-Focused Therapy    Explore patterns in patient's relationships and discuss options for new behaviors.    Explore patterns in patient's actions and choices and discuss options for new behaviors.    Cognitive-behavioral Therapy    Discuss common cognitive distortions, identify them in patient's life.    Explore ways to challenge, replace, and act against these cognitions.    Acceptance and Commitment Therapy    Explore and identify important values in patient's life.    Discuss ways to commit to behavioral activation around these values.    Psychodynamic psychotherapy    Discuss patient's emotional dynamics and issues and how they impact behaviors.    Explore patient's history of relationships and how they impact present behaviors.    Explore how to work with and make changes in these schemas and patterns.    Narrative Therapy    Explore the patient's story of his/her life from his/her perspective.    Explore alternate ways of understanding  their experience, identifying exceptions, developing new themes.    Interpersonal Psychotherapy    Explore patterns in relationships that are effective or ineffective at helping patient reach their goals, find satisfying experience.    Discuss new patterns or behaviors to engage in for improved social functioning.    Behavioral Activation    Discuss steps patient can take to become more involved in meaningful activity.    Identify barriers to these activities and explore possible solutions.    Mindfulness-Based Strategies    Discuss skills based on development and application of mindfulness.    Skills drawn from compassion-focused therapy, dialectical behavior therapy, mindfulness-based stress reduction, mindfulness-based cognitive therapy, etc.      We have developed these goals together during our work to this point. Patient has assisted in the development of these goals and has agreed to this treatment plan.       KATHERINE French, [facility]  September 26, 2019

## 2019-12-04 ENCOUNTER — OFFICE VISIT (OUTPATIENT)
Dept: INTERNAL MEDICINE | Facility: CLINIC | Age: 24
End: 2019-12-04
Payer: COMMERCIAL

## 2019-12-04 VITALS
WEIGHT: 218 LBS | DIASTOLIC BLOOD PRESSURE: 84 MMHG | OXYGEN SATURATION: 97 % | HEART RATE: 65 BPM | SYSTOLIC BLOOD PRESSURE: 121 MMHG

## 2019-12-04 DIAGNOSIS — F33.8 SEASONAL DEPRESSION (H): ICD-10-CM

## 2019-12-04 DIAGNOSIS — F50.819 BINGE EATING DISORDER: ICD-10-CM

## 2019-12-04 DIAGNOSIS — E55.9 VITAMIN D DEFICIENCY: Primary | ICD-10-CM

## 2019-12-04 DIAGNOSIS — F33.0 MILD EPISODE OF RECURRENT MAJOR DEPRESSIVE DISORDER (H): ICD-10-CM

## 2019-12-04 DIAGNOSIS — Z97.5 IUD (INTRAUTERINE DEVICE) IN PLACE: ICD-10-CM

## 2019-12-04 DIAGNOSIS — Z00.00 ROUTINE HISTORY AND PHYSICAL EXAMINATION OF ADULT: ICD-10-CM

## 2019-12-04 RX ORDER — LISDEXAMFETAMINE DIMESYLATE 30 MG/1
30 CAPSULE ORAL DAILY
Qty: 30 CAPSULE | Refills: 0 | Status: SHIPPED | OUTPATIENT
Start: 2020-01-04 | End: 2019-12-23

## 2019-12-04 RX ORDER — LISDEXAMFETAMINE DIMESYLATE 30 MG/1
30 CAPSULE ORAL DAILY
Qty: 30 CAPSULE | Refills: 0 | Status: SHIPPED | OUTPATIENT
Start: 2019-12-04 | End: 2019-12-23

## 2019-12-04 RX ORDER — LISDEXAMFETAMINE DIMESYLATE 30 MG/1
30 CAPSULE ORAL EVERY MORNING
Qty: 30 CAPSULE | Refills: 0 | Status: SHIPPED | OUTPATIENT
Start: 2019-02-04 | End: 2020-05-04

## 2019-12-04 RX ORDER — SERTRALINE HYDROCHLORIDE 25 MG/1
25 TABLET, FILM COATED ORAL DAILY
Qty: 90 TABLET | Refills: 3 | Status: SHIPPED | OUTPATIENT
Start: 2019-12-04 | End: 2020-02-24

## 2019-12-04 ASSESSMENT — PATIENT HEALTH QUESTIONNAIRE - PHQ9: SUM OF ALL RESPONSES TO PHQ QUESTIONS 1-9: 7

## 2019-12-04 ASSESSMENT — PAIN SCALES - GENERAL: PAINLEVEL: NO PAIN (0)

## 2019-12-04 NOTE — PATIENT INSTRUCTIONS
[facility] Medication Refill Request Information:  * Please contact your pharmacy regarding ANY request for medication refills.  ** [facility] Prescription Fax = [phone]  * Please allow 3 business days for routine medication refills.  * Please allow 5 business days for controlled substance medication refills.     [facility] Test Result notification information:  *You will be notified with in 7-10 days of your appointment day regarding the results of your test.  If you are on MyChart you will be notified as soon as the provider has reviewed the results and signed off on them.    [facility]: [phone]

## 2019-12-04 NOTE — PROGRESS NOTES
[facility]  Primary Care Center   SHAKIRA Pulido CNP  12/04/2019      Chief Complaint:   Physical and Recheck Medication       History of Present Illness:   Paris Reddy is a 24 year old female with a history of migraines, vitamin D deficiency, and depression who presents for a routine physical exam.    Mental health: She has been seeing Chucho Maloney and thinks the appointments have been very helpful. Her mood is a lot better. Her smart watch gives her a reminder about when she is supposed to take her medications, which has helped her be more consistent. She has felt somewhat nauseous as a side effect, but not so much that it is interfering with her eating. She feels that her tendencies towards binge eating has improved with the increased consistency of her medication. She inquires whether she should increase her Sertraline for the winter, because she has had increased fatigue recently.     PHQ-9 SCORE 8/27/2019 9/11/2019 12/4/2019   PHQ-9 Total Score MyChart - 9 (Mild depression) -   PHQ-9 Total Score 6 9 7       Vitamin D: She is concerned about her vitamin D getting low and would like it checked. She is taking a multivitamin currently that has ~500 units of vitamin D. She has been vitamin D deficient in the past, and took a 1000 unit supplement for a period of time.     Gyn: She has a Liletta IUD and is due to have it replaced this summer. She does not have periods, but has spotting and cramping once every few months. She is also due for a pap next year.      Health maintenance: She exercises at least 3-4 times per week for at least 30 minutes.    Other concerns discussed:  1. She is delaying going to the TMJ clinic because she is having some dental work done. If her issues are not resolved after the dental work, she will go to TMJ clinic.      Review of Systems:   Pertinent items are noted in HPI or as in patient entered ROS below, remainder of complete ROS is negative.     Active Medications:      Current Outpatient Medications:      levonorgestrel (LILETTA, 52 MG,) 19.5 MCG/DAY IUD, 1 each (19.5 mcg) by Intrauterine route once for 1 dose Due to remove in 2020, Disp: , Rfl:      lisdexamfetamine (VYVANSE) 30 MG capsule, Take 1 capsule (30 mg) by mouth every morning, Disp: 30 capsule, Rfl: 0     ondansetron (ZOFRAN-ODT) 4 MG ODT tab, Take 1-2 tablets (4-8 mg) by mouth every 8 hours as needed for nausea, Disp: 20 tablet, Rfl: 0     order for DME, Equipment being ordered: SAD Light Box, 10,000 Lux strength, Disp: 1 each, Rfl: 0     sertraline (ZOLOFT) 25 MG tablet, Take 1 tablet (25 mg) by mouth daily, Disp: 60 tablet, Rfl: 1     SUMAtriptan (IMITREX) 50 MG tablet, Take 1 tablet (50 mg) by mouth at onset of headache for migraine May repeat in 2 hours. Max 4 tablets/24 hours., Disp: 9 tablet, Rfl: 0     lisdexamfetamine (VYVANSE) 30 MG capsule, Take 1 capsule (30 mg) by mouth daily (Patient not taking: Reported on 12/4/2019), Disp: 30 capsule, Rfl: 0     lisdexamfetamine (VYVANSE) 30 MG capsule, Take 1 capsule (30 mg) by mouth daily (Patient not taking: Reported on 12/4/2019), Disp: 30 capsule, Rfl: 0      Allergies:   Patient has no known allergies.      Past Medical History:  Migraines  Mild episode of recurrent major depressive disorder  Obesity  Vitamin D deficiency     Past Surgical History:  Cholecystectomy: 2018    Family History:   Cervical cancer: mother (HPV+)  Migraines: mother      Social History:   Smoking status: never smoker  Alcohol use: 2-4 times per month  Drug use: marijuana      Physical Exam:   /84 (BP Location: Right arm, Patient Position: Sitting, Cuff Size: Adult Large)   Pulse 65   Wt 98.9 kg (218 lb)   SpO2 97%    Constitutional: no distress, comfortable, pleasant, well-groomed  Eyes: anicteric, conjunctiva pink, normal extra-ocular movements   Ears, Nose and Throat: tympanic membranes pearly gray with positive light reflex, EACs clear bilaterally, nose clear and free of  lesions, throat clear, mucosa pink and moist.   Neck: supple with full range of motion, no thyromegaly.   Cardiovascular: regular rate and rhythm, normal S1 and S2, no murmurs, rubs or gallops, peripheral pulses full and symmetric  Respiratory: clear to auscultation with good air movement bilaterally, no wheezes or crackles, non-labored  Musculoskeletal: full range of motion, strength 5/5, no edema   Skin: no concerning lesions or rash, no jaundice, temp normal   Neurological: cranial nerves intact, normal strength and sensation, 2+ patellar reflexes, gait is steady with intact balance, speech is clear, no tremor   Psychological: appropriate mood, demonstrates intact judgment and logical thought process  LYMPH: no cervical,  supraclavicular, or infraclavicular nodes      Assessment and Plan:  Routine physical examination of adult  Encouraged healthy lifestyle with nutritious diet and increased physical activity.    Vitamin D deficiency  She is concerned that she may be vitamin D deficient. Will check lab today, and if it is low, she can take a 1000 unit(s) supplement.   - Vitamin D Deficiency    IUD (intrauterine device) in place  She will need her IUD replaced in summer of 2020. She would like to see the women's clinic here to have that done.  - OB/GYN REFERRAL    Seasonal depression (H)  Discussed a trial of light box therapy for her worsening fatigue for a few weeks. If there is not improvement, can increase her Sertraline 25 mg for the rest of the winter. Self-care encouraged. Continue meeting with Chucho Maloney for therapy.   - order for DME  Dispense: 1 each; Refill: 0    Binge eating disorder  Her eating has been improving since she has been more consistent in taking Vyvanse. Refilled today.   - lisdexamfetamine (VYVANSE) 30 MG capsule  Dispense: 30 capsule; Refill: 0  - lisdexamfetamine (VYVANSE) 30 MG capsule  Dispense: 30 capsule; Refill: 0  - lisdexamfetamine (VYVANSE) 30 MG capsule  Dispense: 30 capsule;  Refill: 0    Mild episode of recurrent major depressive disorder (H)  Refilled.   - sertraline (ZOLOFT) 25 MG tablet  Dispense: 90 tablet; Refill: 3    Follow-up: in 6 months or sooner as needed        Scribe Disclosure:  I, Jeanna Aguirre, am serving as a scribe to document services personally performed by SHAKIRA Pulido CNP at this visit, based upon the provider's statements to me. All documentation has been reviewed by the aforementioned provider prior to being entered into the official medical record.     Portions of this medical record were completed by a scribe. UPON MY REVIEW AND AUTHENTICATION BY ELECTRONIC SIGNATURE, this confirms (a) I performed the applicable clinical services, and (b) the record is accurate.     SHAKIRA Pulido CNP

## 2019-12-04 NOTE — NURSING NOTE
Chief Complaint   Patient presents with     Physical     Pt here for physical     Recheck Medication     pt here for med check       Guzman Rivers CMA, EMT at 3:28 PM on 12/4/2019.

## 2019-12-23 DIAGNOSIS — F50.819 BINGE EATING DISORDER: ICD-10-CM

## 2019-12-23 RX ORDER — LISDEXAMFETAMINE DIMESYLATE 30 MG/1
30 CAPSULE ORAL DAILY
Qty: 30 CAPSULE | Refills: 0 | Status: SHIPPED | OUTPATIENT
Start: 2020-02-24 | End: 2020-05-04

## 2019-12-23 RX ORDER — LISDEXAMFETAMINE DIMESYLATE 30 MG/1
30 CAPSULE ORAL DAILY
Qty: 30 CAPSULE | Refills: 0 | Status: SHIPPED | OUTPATIENT
Start: 2020-01-25 | End: 2020-05-04

## 2019-12-23 NOTE — TELEPHONE ENCOUNTER
Patient updated clinic that she has been unable to obtain Vyvanse from her pharmacy. Bethany reported that this is not the first time she has had a delay with obtaining her medication from this [facility] pharmacy.    I called the pharmacy to inquire further regarding the delay. Pharmacy staff (Oleg) reported that the medication was out of stock and needed to be ordered. It should arrive on Thursday or Friday of this week.    As patient has experienced delays in the past, I asked pharmacy staff if this medication is typically in stock, or if a special order is required for each fill. Staff reported that the Vyvanse is typically in stock, though they were out at the time patient requested a refill.    Bethany was updated regarding the delay, and expected arrival of Vyvanse on 12/26/19 or 12/27/19. Bethany prefers that future refills go to the [facility] [place] [facility]    Alona Crawford) SALVADOR Rodriguez

## 2019-12-26 ENCOUNTER — OFFICE VISIT (OUTPATIENT)
Dept: FAMILY MEDICINE | Facility: CLINIC | Age: 24
End: 2019-12-26
Payer: COMMERCIAL

## 2019-12-26 VITALS
OXYGEN SATURATION: 100 % | SYSTOLIC BLOOD PRESSURE: 116 MMHG | DIASTOLIC BLOOD PRESSURE: 74 MMHG | WEIGHT: 224 LBS | TEMPERATURE: 98.6 F | HEART RATE: 99 BPM | BODY MASS INDEX: 38.24 KG/M2 | HEIGHT: 64 IN

## 2019-12-26 DIAGNOSIS — B09 VIRAL RASH: Primary | ICD-10-CM

## 2019-12-26 PROCEDURE — 99213 OFFICE O/P EST LOW 20 MIN: CPT | Performed by: PHYSICIAN ASSISTANT

## 2019-12-26 RX ORDER — VALACYCLOVIR HYDROCHLORIDE 500 MG/1
500 TABLET, FILM COATED ORAL 2 TIMES DAILY
Qty: 10 TABLET | Refills: 0 | Status: SHIPPED | OUTPATIENT
Start: 2019-12-26 | End: 2020-10-08

## 2019-12-26 ASSESSMENT — MIFFLIN-ST. JEOR: SCORE: 1746.06

## 2019-12-26 NOTE — PROGRESS NOTES
SUBJECTIVE:  Paris Reddy is a 24 year old female who presents to the clinic today for a rash.  Onset of rash was 1 day(s) ago.   Rash is sudden onset.  Location of the rash: chin.  Quality/symptoms of rash: itching, burning, red and blistering   Symptoms are mild rash seems to be worsening.  Previous history of a similar rash? Yes: The last time this happened was 2 years ago and she was treated with Valtrex (Which alleviated her symptoms)  Recent exposure history: none known    Associated symptoms include: nothing.    Past Medical History:   Diagnosis Date     Migraines      Current Outpatient Medications   Medication Sig Dispense Refill     levonorgestrel (LILETTA, 52 MG,) 19.5 MCG/DAY IUD 1 each (19.5 mcg) by Intrauterine route once for 1 dose Due to remove in 2020       [START ON 2/24/2020] lisdexamfetamine (VYVANSE) 30 MG capsule Take 1 capsule (30 mg) by mouth daily 30 capsule 0     [START ON 1/25/2020] lisdexamfetamine (VYVANSE) 30 MG capsule Take 1 capsule (30 mg) by mouth daily 30 capsule 0     lisdexamfetamine (VYVANSE) 30 MG capsule Take 1 capsule (30 mg) by mouth every morning 30 capsule 0     ondansetron (ZOFRAN-ODT) 4 MG ODT tab Take 1-2 tablets (4-8 mg) by mouth every 8 hours as needed for nausea 20 tablet 0     order for DME Equipment being ordered: SAD Light Box, 10,000 Lux strength 1 each 0     sertraline (ZOLOFT) 25 MG tablet Take 1 tablet (25 mg) by mouth daily 90 tablet 3     SUMAtriptan (IMITREX) 50 MG tablet Take 1 tablet (50 mg) by mouth at onset of headache for migraine May repeat in 2 hours. Max 4 tablets/24 hours. 9 tablet 0     Social History     Tobacco Use     Smoking status: Never Smoker     Smokeless tobacco: Never Used   Substance Use Topics     Alcohol use: Yes     Frequency: 2-4 times a month     Comment: social       ROS:  CONSTITUTIONAL:NEGATIVE for fever, chills, change in weight  EYES: NEGATIVE for vision changes or irritation  ENT/MOUTH: NEGATIVE for ear, mouth and  throat problems  RESP:NEGATIVE for significant cough or SOB  CV: NEGATIVE for chest pain, palpitations or peripheral edema  GI: NEGATIVE for nausea, abdominal pain, heartburn, or change in bowel habits  HEME/ALLERGY/IMMUNE: NEGATIVE for bleeding problems  PSYCHIATRIC: NEGATIVE for changes in mood or affect    EXAM:   There were no vitals taken for this visit.  GENERAL: alert, no acute distress.  SKIN: Rash description:    Distribution: localized  Location: chin    Color: red and blistered (Viral appearance),  Lesion type: maculopapular, blotchy with no other findings  GENERAL APPEARANCE: healthy, alert and no distress  EYES: EOMI,  PERRL, conjunctiva clear  NECK: supple, non-tender to palpation, no adenopathy noted  RESP: lungs clear to auscultation - no rales, rhonchi or wheezes  CV: regular rates and rhythm, normal S1 S2, no murmur noted    ASSESSMENT/PLAN:    (B09) Viral rash  (primary encounter diagnosis)  Plan: valACYclovir (VALTREX) 500 MG tablet    Patient is to take Valtrex 500 mg- 2 tabs daily for the next 5 days. Follow up with PCP if symptoms do not improve in the next 4-5 days.    Jacques Hu PA-C on 12/26/2019 at 12:33 PM

## 2020-02-12 ENCOUNTER — OFFICE VISIT (OUTPATIENT)
Dept: BEHAVIORAL HEALTH | Facility: CLINIC | Age: 25
End: 2020-02-12
Payer: COMMERCIAL

## 2020-02-12 DIAGNOSIS — F33.1 MAJOR DEPRESSIVE DISORDER, RECURRENT EPISODE, MODERATE (H): ICD-10-CM

## 2020-02-12 DIAGNOSIS — F33.0 MAJOR DEPRESSIVE DISORDER, RECURRENT EPISODE, MILD (H): Primary | ICD-10-CM

## 2020-02-12 NOTE — PROGRESS NOTES
[facility] (PCC referral)  February 12, 2020        Behavioral Health Clinician Progress Note    Patient Name: Paris Reddy           Service Type:  Individual      Service Location:   Face to Face in Clinic     Session Start Time: 3pm  Session End Time: 355pm      Session Length: 53 - 60      Attendees: Patient    Visit Activities (Refresh list every visit): [facility] Only    Diagnostic Assessment Date: 9/26/2019  Treatment Plan Review Date: 9/26/2019   CGI-S: 5    See Flowsheets for today's PHQ-9 and ANGUS-7 results  Previous PHQ-9:   PHQ-9 SCORE 8/27/2019 9/11/2019 12/4/2019   PHQ-9 Total Score MyChart - 9 (Mild depression) -   PHQ-9 Total Score 6 9 7     Previous ANGUS-7:   ANGUS-7 SCORE 8/27/2019 9/11/2019   Total Score - 6 (mild anxiety)   Total Score 3 6       PITER LEVEL:  No flowsheet data found.    DATA  Extended Session (60+ minutes): No  Interactive Complexity: No  Crisis: No  [facility] Patient: No    Treatment Objective(s) Addressed in This Session:  Target Behavior(s): disease management/lifestyle changes      Patient  will experience a reduction in depressed mood, will develop more effective coping skills to manage depressive symptoms, will develop healthy cognitive patterns and beliefs and will increase ability to function adaptively, will experience a reduction in anxiety, will develop more effective coping skills to manage anxiety symptoms, will develop healthy cognitive patterns and beliefs and will increase ability to function adaptively and will develop coping/problem-solving skills to facilitate more adaptive adjustment    Current Stressors / Issues:  [facility] met with Paris to provide psychotherapy support regarding anxiety and depression, life stress.    Discussed a recent incident of very high anxiety - normalized symptoms, processed her experience, discussed anxiety in general. Explored CBT skills we have discussed previously.  Reviewed a handout that gives suggestions for panic  attacks and adapted it for her use with this sort of anxiety attack. Explored notions of working with anxiety as either (a) not based on reality and we can work to let it go, OR (b) based on reality and something we can handle.    Otherwise, in general, she reports she is doing pretty well. She is sad about not being able to go back to school. She reports feeling less anxious and ruminative since starting with the CBT and having our conversations. We continued our conversation about the ideas form CBT and other approaches, to assist her in working with her current stressors.     Discussed her struggles with eating and weight. She says she is working hard and not seeing results she would like. Discussed the various goals she has and how to begin breaking them down for easier success. Discussed SMART goals:  Specific (simple, sensible, significant).   Measurable (meaningful, motivating).   Achievable (agreed, attainable).   Relevant (reasonable, realistic and resourced, results-based).   Time bound (time-based, time limited, time/cost limited, timely, time-sensitive).  We will continue to explore this as we move forward.  She has established an exercise habit. Discussed Stages of Change (Prochamallory, et al).    I affirmed the steps this patient has taken to address physical and behavioral health issues, and offered continued behavioral health services or referral, now or in the future, as needed by the patient.    Progress on Treatment Objective(s) / Homework:  Satisfactory progress - ACTION (Actively working towards change); Intervened by reinforcing change plan / affirming steps taken    Interventions drawn from:  Psycho-education regarding mental health diagnoses and treatment options    Motivational Interviewing    Skills training    Explored specific skills useful to client in current situation    Skill areas include assertiveness, communication, conflict management, problem-solving, relaxation, etc.      Solution-Focused Therapy    Explored patterns in patient's behaviors and relationships and discussed options for new behaviors    Explored new options for problem-solving, communication, managing stress, etc.    Cognitive-behavioral Therapy    Discussed common cognitive distortions, identified them in patient's life    Explored ways to challenge, replace, and act against these cognitions    Explore behavioral changes that might benefit patient in improving mood and engage in meaningful activity    Acceptance and Commitment Therapy    Explored and identified important values in patient's life    Discussed ways to commit to behavioral activation around these values    Psychodynamic psychotherapy    Discussed patient's emotional dynamics and issues and how they impact behaviors    Explored patient's history of relationships and how they impact present behaviors    Explored how to work with and make changes in these schemas and patterns    Narrative Therapy    Explored the patient's story of their life from their perspective,     Explored alternate ways of understanding their experience, identifying exceptions, developing new themes    Existential Psychotherapy    Explored the patient's experience of an concerns about existential concerns: meaning, freedom, purpose in life, life and death, etc,     Explored systems of meaning, methods for exploring these questions, resources for discussion, etc.    Interpersonal Psychotherapy    Explored patterns in relationships that are effective or ineffective at helping patient reach their goals, find satisfying experience.    Discussed new patterns or behaviors to engage in for improved social functioning.    Behavioral Activation    Discussed steps patient can take to become more involved in meaningful activity    Identified barriers to these activities and explored possible solutions    Mindfulness-Based Strategies    Discussed skills based on development and application of  mindfulness    Skills drawn from compassion-focused therapy, dialectical behavior therapy, mindfulness-based stress reduction, mindfulness-based cognitive therapy, etc.    Medication Review:  No problems reported to [facility] today.    Medication Compliance:  No problems reported to [facility] today.    Changes in Health Issues:  No problems reported to [facility] today.    Chemical Use Review:   Substance Use: Chemical use reviewed, no active concerns identified      Tobacco Use: No current tobacco use.      Mental Status Assessment:  Appearance:   Appropriate   Eye Contact:   Good   Psychomotor Behavior: Normal   Attitude:   Cooperative   Orientation:   All  Speech   Rate / Production: Normal    Volume:  Normal   Mood:    Anxious  Depressed - improved lately, per her report  Affect:    Appropriate  Subdued   Thought Content:  Clear  Rumination   Thought Form:  Coherent  Logical   Insight:    Good     Safety Assessment:  Patient has had a history of suicidal ideation: some passive thoughts when she was younger - no plans, no intent, no attempts  Patient denies current or recent suicidal ideation or behaviors.  Patient denies current or recent homicidal ideation or behaviors.  Patient denies current or recent self injurious behavior or ideation.  Patient denies other safety concerns.  Patient reports there are no firearms in the house  Protective Factors Life Satisfaction, Reality testing ability, Positive coping skills, Positive problem-solving skills and Positive social support   Risk Factors Current high stress    Plan for Safety and Risk Management:  A safety and risk management plan has not been developed at this time, however patient was encouraged to call [facility] should there be a change in any of these risk factors.    Diagnoses:  1. Major depressive disorder, recurrent episode, mild (H)    Consider developmental PTSD, ANGUS, eating disorder    Collateral Reports Completed:  Will collaborate with care team as  indicated during treatment    Plan: (Homework, other):  Patient was given information about behavioral services and encouraged to schedule a follow up appointment with the clinic [facility] as needed.  She was also given information about mental health symptoms and treatment options  and Cognitive Behavioral Therapy skills to practice when experiencing anxiety and depression.  CD Recommendations: No indications of CD issues. We agree to begin a course of psychotherapy to address anxiety, depression, stress.  KATHERINE Neal, [facility] [place]    ______________________________________________________________________    [facility] Integrated Behavioral Health Treatment Plan    Client's Name: Paris Reddy  YOB: 1995    Date: 9/26/2019    DSM5 Diagnoses: (Sustained by DSM5 Criteria Listed Above)  Diagnoses: 296.22 (F32.1)  Major Depressive Disorder, Single Episode, Moderate With anxious distress  Psychosocial & Contextual Factors: social stressors, possible hx of childhood trauma  WHODAS Score: 20  See Media section of EPIC medical record for completed WHODAS  CGI-S: 5    Referral / Collaboration:  Will collaborate with care team as indicated during treatment.    Anticipated number of session or this episode of care: 5-10      MeasurableTreatment Goal(s) related to diagnosis / functional impairment(s)  Goal 1:  Patient will experience a reduction in depressive and anxious symptoms, along with a corresponding increase in positive emotion and life satisfaction.    Objective #A: Patient will experience a reduction in depressed mood, will develop more effective coping skills to manage depressive symptoms, will develop healthy cognitive patterns and beliefs, will increase ability to function adaptively and will continue to take medications as prescribed / participate in supportive activities and services    Status: Continued - Date(s): 9/26/2019    Objective #B: Patient will experience a reduction in anxiety, will develop more  effective coping skills to manage anxiety symptoms, will develop healthy cognitive patterns and beliefs and will increase ability to function adaptively  Status: Continued - Date(s): 9/26/2019    Objective #C: Patient will develop better understanding of triggers and coping strategies to stabilize mood  Status: Continued - Date(s): 9/26/2019    Goal 2:  Patient will identify and increase engagement in valued activity, i.e. improving social connections/relationships, pursuing occupational goals or personally meaningful pursuits, exploration of meaning in life.     Objective #A: Patient will identify meaningful activity in social, occupational and  personal goals, and increase behavioral activation around these goals   Status: Continued - Date(s): 9/26/2019    Objective #B: Patient will address relationship difficulties in a more adaptive manner  Status: Continued - Date(s): 9/26/2019    Objective #C: Patient will develop coping/problem-solving skills to facilitate more adaptive adjustment and will effectively address problems that interfere with adaptive functioning  Status: Continued - Date(s): 9/26/2019    Possible Therapeutic Intervention(s)  Psycho-education regarding mental health diagnoses and treatment options    Eye-Movement Desensitization and Reprocessing Therapy    Clinical Hypnosis    Skills training    Explore skills useful to client in current situation.    Skills include assertiveness, communication, conflict management, problem-solving, relaxation, etc.    Solution-Focused Therapy    Explore patterns in patient's relationships and discuss options for new behaviors.    Explore patterns in patient's actions and choices and discuss options for new behaviors.    Cognitive-behavioral Therapy    Discuss common cognitive distortions, identify them in patient's life.    Explore ways to challenge, replace, and act against these cognitions.    Acceptance and Commitment Therapy    Explore and identify important  values in patient's life.    Discuss ways to commit to behavioral activation around these values.    Psychodynamic psychotherapy    Discuss patient's emotional dynamics and issues and how they impact behaviors.    Explore patient's history of relationships and how they impact present behaviors.    Explore how to work with and make changes in these schemas and patterns.    Narrative Therapy    Explore the patient's story of his/her life from his/her perspective.    Explore alternate ways of understanding their experience, identifying exceptions, developing new themes.    Interpersonal Psychotherapy    Explore patterns in relationships that are effective or ineffective at helping patient reach their goals, find satisfying experience.    Discuss new patterns or behaviors to engage in for improved social functioning.    Behavioral Activation    Discuss steps patient can take to become more involved in meaningful activity.    Identify barriers to these activities and explore possible solutions.    Mindfulness-Based Strategies    Discuss skills based on development and application of mindfulness.    Skills drawn from compassion-focused therapy, dialectical behavior therapy, mindfulness-based stress reduction, mindfulness-based cognitive therapy, etc.      We have developed these goals together during our work to this point. Patient has assisted in the development of these goals and has agreed to this treatment plan.       KATHERINE French, [facility]  September 26, 2019

## 2020-02-22 DIAGNOSIS — F33.0 MILD EPISODE OF RECURRENT MAJOR DEPRESSIVE DISORDER (H): ICD-10-CM

## 2020-02-24 RX ORDER — SERTRALINE HYDROCHLORIDE 25 MG/1
25 TABLET, FILM COATED ORAL DAILY
Qty: 90 TABLET | Refills: 3 | Status: SHIPPED | OUTPATIENT
Start: 2020-02-24 | End: 2020-05-04

## 2020-02-24 NOTE — TELEPHONE ENCOUNTER
SERTRALINE 25MG TABLETS  Last Written Prescription Date:  12/4/2019  Last Fill Quantity: 90,   # refills: 3  Last Office Visit : 12/4/2019  Future Office visit:  None  90 Tabs, 3 Refills sent to pharm 2/24/2020.    Beverly Wei RN  Central Triage Red Flags/Med Refills        Flowsheet Report      Review Flowsheet   PHQ-9 and GAD7 Scores  2/8/2019 8/27/2019 9/11/2019 12/4/2019    PHQ9 TOTAL SCORE - - 9 (Mild depression) -   PHQ-9 Total Score 9 6 9 7        ANGUS-7 Total Score - 3 6 -    2/8/2019 8/27/2019 9/11/2019 12/4/2019   Review Flowsheet

## 2020-02-28 ENCOUNTER — OFFICE VISIT (OUTPATIENT)
Dept: INTERNAL MEDICINE | Facility: CLINIC | Age: 25
End: 2020-02-28
Payer: COMMERCIAL

## 2020-02-28 VITALS
SYSTOLIC BLOOD PRESSURE: 127 MMHG | TEMPERATURE: 98.3 F | DIASTOLIC BLOOD PRESSURE: 84 MMHG | HEART RATE: 87 BPM | OXYGEN SATURATION: 97 %

## 2020-02-28 DIAGNOSIS — R30.0 DYSURIA: Primary | ICD-10-CM

## 2020-02-28 LAB
ALBUMIN UR-MCNC: NEGATIVE MG/DL
APPEARANCE UR: ABNORMAL
BACTERIA #/AREA URNS HPF: ABNORMAL /HPF
BILIRUB UR QL STRIP: NEGATIVE
COLOR UR AUTO: YELLOW
GLUCOSE UR STRIP-MCNC: NEGATIVE MG/DL
HGB UR QL STRIP: NEGATIVE
KETONES UR STRIP-MCNC: NEGATIVE MG/DL
LEUKOCYTE ESTERASE UR QL STRIP: ABNORMAL
MUCOUS THREADS #/AREA URNS LPF: PRESENT /LPF
NITRATE UR QL: NEGATIVE
PH UR STRIP: 6 PH (ref 5–7)
RBC #/AREA URNS AUTO: 2 /HPF (ref 0–2)
SOURCE: ABNORMAL
SP GR UR STRIP: 1.02 (ref 1–1.03)
SQUAMOUS #/AREA URNS AUTO: 7 /HPF (ref 0–1)
UROBILINOGEN UR STRIP-MCNC: 0 MG/DL (ref 0–2)
WBC #/AREA URNS AUTO: 3 /HPF (ref 0–5)

## 2020-02-28 ASSESSMENT — PAIN SCALES - GENERAL: PAINLEVEL: MODERATE PAIN (4)

## 2020-02-28 NOTE — PATIENT INSTRUCTIONS
[facility] Medication Refill Request Information:  * Please contact your pharmacy regarding ANY request for medication refills.  ** [facility] Prescription Fax = [phone]  * Please allow 3 business days for routine medication refills.  * Please allow 5 business days for controlled substance medication refills.     [facility] Test Result notification information:  *You will be notified with in 7-10 days of your appointment day regarding the results of your test.  If you are on MyChart you will be notified as soon as the provider has reviewed the results and signed off on them.    [facility]: [phone]

## 2020-02-28 NOTE — PROGRESS NOTES
[facility]  Primary Care Center   Monae LEvelyn Cochran, SHAKIRA CNP  02/28/2020      Chief Complaint:   UTI       History of Present Illness:   Paris Reddy is a 24 year old female with a history of migraines, vitamin D deficiency, and depression who presents for evaluation of a possible UTI.    UTI:  The patient reports a sudden onset of UTI symptoms earlier this morning including lower abdominal pain and urinary urgency without a large voiding volume. She also endorses some dull flank pain, R>L. Yesterday, she felt very hot in her room but thought it was just the thermostat, no measured fever. Denies nausea, vomiting, constipation or diarrhea. She only recalls one UTI last summer and was seen at urgent care for this.     Review of Systems:   Pertinent items are noted in HPI or as in patient entered ROS below, remainder of complete ROS is negative.     Active Medications:      levonorgestrel (LILETTA, 52 MG,) 19.5 MCG/DAY IUD, 1 each (19.5 mcg) by Intrauterine route once for 1 dose Due to remove in 2020, Disp: , Rfl:      lisdexamfetamine (VYVANSE) 30 MG capsule, Take 1 capsule (30 mg) by mouth daily, Disp: 30 capsule, Rfl: 0     lisdexamfetamine (VYVANSE) 30 MG capsule, Take 1 capsule (30 mg) by mouth daily, Disp: 30 capsule, Rfl: 0     lisdexamfetamine (VYVANSE) 30 MG capsule, Take 1 capsule (30 mg) by mouth every morning, Disp: 30 capsule, Rfl: 0     ondansetron (ZOFRAN-ODT) 4 MG ODT tab, Take 1-2 tablets (4-8 mg) by mouth every 8 hours as needed for nausea, Disp: 20 tablet, Rfl: 0     sertraline (ZOLOFT) 25 MG tablet, Take 1 tablet (25 mg) by mouth daily, Disp: 90 tablet, Rfl: 3     SUMAtriptan (IMITREX) 50 MG tablet, Take 1 tablet (50 mg) by mouth at onset of headache for migraine May repeat in 2 hours. Max 4 tablets/24 hours., Disp: 9 tablet, Rfl: 0     valACYclovir (VALTREX) 500 MG tablet, Take 1 tablet (500 mg) by mouth 2 times daily for 5 days, Disp: 10 tablet, Rfl: 0      Allergies:   Allergies reviewed.  Patient has no known allergies.      Past Medical History:  Migraines  Depression  Obesity  Vitamin D deficiency  IUD in place    Past Surgical History:  Cholecystectomy    Family History:   Cervical cancer, HPV+ - mother  Migraines - mother      Social History:   This patient presented alone.  Smoking status: never smoked  Smokeless tobacco: never used  Alcohol use: yes, 2-4 times a month, social  Drug use: yes, marijuana     Physical Exam:   /84   Pulse 87   Temp 98.3  F (36.8  C) (Oral)   SpO2 97%    Constitutional: Alert, oriented, pleasant, no acute distress  Head: Normocephalic, atraumatic  Eyes: Extra-ocular movements intact, pupils equally round and reactive bilaterally, no scleral icterus  ENT: Oropharynx clear, moist mucus membranes, good dentition  Cardiovascular: Regular rate and rhythm, no murmurs, rubs or gallops  Respiratory: Good air movement bilaterally, lungs clear, no wheezes/rales/rhonchi  GI: Abdomen soft, bowel sounds present, nondistended, suprapubic tenderness, no organomegaly or masses, no rebound/guarding, mild bilateral CVA tenderness  Psychiatric: normal mentation, affect and mood     Assessment and Plan:  Dysuria  Will obtain UA/UC to evaluate for UTI. Push fluids. Will notify patient of results. UA is negative.   - UA with Micro reflex to Culture     Follow-up: PRN     Scribe Disclosure:  I, Flaquita aGlo, am serving as a scribe to document services personally performed by SHAKIRA Pulido CNP at this visit, based upon the provider's statements to me. All documentation has been reviewed by the aforementioned provider prior to being entered into the official medical record.     Portions of this medical record were completed by a scribe. UPON MY REVIEW AND AUTHENTICATION BY ELECTRONIC SIGNATURE, this confirms (a) I performed the applicable clinical services, and (b) the record is accurate.     SHAKIRA Pulido CNP

## 2020-02-28 NOTE — NURSING NOTE
Chief Complaint   Patient presents with     UTI     Pt is here for UTI sx.      Kathya Carpio LPN at 7:00 AM on 2/28/2020.

## 2020-03-19 ENCOUNTER — OFFICE VISIT (OUTPATIENT)
Dept: BEHAVIORAL HEALTH | Facility: CLINIC | Age: 25
End: 2020-03-19
Payer: COMMERCIAL

## 2020-03-19 DIAGNOSIS — F33.0 MAJOR DEPRESSIVE DISORDER, RECURRENT EPISODE, MILD (H): Primary | ICD-10-CM

## 2020-03-19 NOTE — PROGRESS NOTES
[facility] (PCC referral)  March 19, 2020        Behavioral Health Clinician Progress Note    Patient Name: Paris Reddy           Service Type:  Individual      Service Location:   Face to Face in Clinic     Session Start Time: 335pm  Session End Time: 420pm      Session Length: 38 - 52      Attendees: Patient    Visit Activities (Refresh list every visit): [facility] [place] Only    Diagnostic Assessment Date: 9/26/2019  Treatment Plan Review Date: 9/26/2019   CGI-S: 5    See Flowsheets for today's PHQ-9 and ANGUS-7 results  Previous PHQ-9:   PHQ-9 SCORE 8/27/2019 9/11/2019 12/4/2019   PHQ-9 Total Score MyChart - 9 (Mild depression) -   PHQ-9 Total Score 6 9 7     Previous ANGUS-7:   ANGUS-7 SCORE 8/27/2019 9/11/2019   Total Score - 6 (mild anxiety)   Total Score 3 6       PITER LEVEL:  No flowsheet data found.    DATA  Extended Session (60+ minutes): No  Interactive Complexity: No  Crisis: No  [facility] Patient: No    Treatment Objective(s) Addressed in This Session:  Target Behavior(s): disease management/lifestyle changes      Patient  will experience a reduction in depressed mood, will develop more effective coping skills to manage depressive symptoms, will develop healthy cognitive patterns and beliefs and will increase ability to function adaptively, will experience a reduction in anxiety, will develop more effective coping skills to manage anxiety symptoms, will develop healthy cognitive patterns and beliefs and will increase ability to function adaptively and will develop coping/problem-solving skills to facilitate more adaptive adjustment    Current Stressors / Issues:  [facility] [place] met with Paris to provide psychotherapy support regarding anxiety and depression, life stress.    Discussed Wing's current stressors, largely related to the disruption of her life by coronavirus concerns. Her and her boyfriend's  Financial situation have been impacted and their are concerns going forward, of course. Processed  "her fears, anxieties, stressors. Discussed stress manegement, sefl-care, CBT techniques.    Being at home is stressful, she reports, since they live with other people. She is planning to move in May and is looking forward to that.    Talked about adjusting expectations, accepting reality as it is and focusing on her values. Discussed Nathan's \"must versus prefer\" mindset, acceptance ideas, etc.    From previous sessions:  Discussed her struggles with eating and weight. She says she is working hard and not seeing results she would like. Discussed the various goals she has and how to begin breaking them down for easier success. Discussed SMART goals:  Specific (simple, sensible, significant).   Measurable (meaningful, motivating).   Achievable (agreed, attainable).   Relevant (reasonable, realistic and resourced, results-based).   Time bound (time-based, time limited, time/cost limited, timely, time-sensitive).  We will continue to explore this as we move forward.  She has established an exercise habit. Discussed Stages of Change (Prochamallory, et al).    I affirmed the steps this patient has taken to address physical and behavioral health issues, and offered continued behavioral health services or referral, now or in the future, as needed by the patient.    Progress on Treatment Objective(s) / Homework:  Satisfactory progress - ACTION (Actively working towards change); Intervened by reinforcing change plan / affirming steps taken    Interventions drawn from:  Psycho-education regarding mental health diagnoses and treatment options    Motivational Interviewing    Skills training    Explored specific skills useful to client in current situation    Skill areas include assertiveness, communication, conflict management, problem-solving, relaxation, etc.     Solution-Focused Therapy    Explored patterns in patient's behaviors and relationships and discussed options for new behaviors    Explored new options for problem-solving, " communication, managing stress, etc.    Cognitive-behavioral Therapy    Discussed common cognitive distortions, identified them in patient's life    Explored ways to challenge, replace, and act against these cognitions    Explore behavioral changes that might benefit patient in improving mood and engage in meaningful activity    Acceptance and Commitment Therapy    Explored and identified important values in patient's life    Discussed ways to commit to behavioral activation around these values    Psychodynamic psychotherapy    Discussed patient's emotional dynamics and issues and how they impact behaviors    Explored patient's history of relationships and how they impact present behaviors    Explored how to work with and make changes in these schemas and patterns    Narrative Therapy    Explored the patient's story of their life from their perspective,     Explored alternate ways of understanding their experience, identifying exceptions, developing new themes    Existential Psychotherapy    Explored the patient's experience of an concerns about existential concerns: meaning, freedom, purpose in life, life and death, etc,     Explored systems of meaning, methods for exploring these questions, resources for discussion, etc.    Interpersonal Psychotherapy    Explored patterns in relationships that are effective or ineffective at helping patient reach their goals, find satisfying experience.    Discussed new patterns or behaviors to engage in for improved social functioning.    Behavioral Activation    Discussed steps patient can take to become more involved in meaningful activity    Identified barriers to these activities and explored possible solutions    Mindfulness-Based Strategies    Discussed skills based on development and application of mindfulness    Skills drawn from compassion-focused therapy, dialectical behavior therapy, mindfulness-based stress reduction, mindfulness-based cognitive therapy,  etc.    Medication Review:  No problems reported to [facility] today.    Medication Compliance:  No problems reported to [facility] today.    Changes in Health Issues:  No problems reported to [facility] today.    Chemical Use Review:   Substance Use: Chemical use reviewed, no active concerns identified      Tobacco Use: No current tobacco use.      Mental Status Assessment:  Appearance:   Appropriate   Eye Contact:   Good   Psychomotor Behavior: Normal   Attitude:   Cooperative   Orientation:   All  Speech   Rate / Production: Normal    Volume:  Normal   Mood:    Anxious  Depressed - improved lately, per her report  Affect:    Appropriate  Subdued   Thought Content:  Clear  Rumination   Thought Form:  Coherent  Logical   Insight:    Good     Safety Assessment:  Patient has had a history of suicidal ideation: some passive thoughts when she was younger - no plans, no intent, no attempts  Patient denies current or recent suicidal ideation or behaviors.  Patient denies current or recent homicidal ideation or behaviors.  Patient denies current or recent self injurious behavior or ideation.  Patient denies other safety concerns.  Patient reports there are no firearms in the house  Protective Factors Life Satisfaction, Reality testing ability, Positive coping skills, Positive problem-solving skills and Positive social support   Risk Factors Current high stress    Plan for Safety and Risk Management:  A safety and risk management plan has not been developed at this time, however patient was encouraged to call [facility] should there be a change in any of these risk factors.    Diagnoses:  1. Major depressive disorder, recurrent episode, mild (H)    Consider developmental PTSD, ANGUS, eating disorder    Collateral Reports Completed:  Will collaborate with care team as indicated during treatment    Plan: (Homework, other):  Patient was given information about behavioral services and encouraged to schedule a follow up appointment with the  clinic [facility] as needed.  She was also given information about mental health symptoms and treatment options  and Cognitive Behavioral Therapy skills to practice when experiencing anxiety and depression.  CD Recommendations: No indications of CD issues. We agree to begin a course of psychotherapy to address anxiety, depression, stress.  KATHERINE Neal, [facility]    ______________________________________________________________________    [facility] Integrated [facility]al Health Treatment Plan    Client's Name: Paris Reddy  YOB: 1995    Date: 9/26/2019    DSM5 Diagnoses: (Sustained by DSM5 Criteria Listed Above)  Diagnoses: 296.22 (F32.1)  Major Depressive Disorder, Single Episode, Moderate With anxious distress  Psychosocial & Contextual Factors: social stressors, possible hx of childhood trauma  WHODAS Score: 20  See Media section of EPIC medical record for completed WHODAS  CGI-S: 5    Referral / Collaboration:  Will collaborate with care team as indicated during treatment.    Anticipated number of session or this episode of care: 5-10      MeasurableTreatment Goal(s) related to diagnosis / functional impairment(s)  Goal 1:  Patient will experience a reduction in depressive and anxious symptoms, along with a corresponding increase in positive emotion and life satisfaction.    Objective #A: Patient will experience a reduction in depressed mood, will develop more effective coping skills to manage depressive symptoms, will develop healthy cognitive patterns and beliefs, will increase ability to function adaptively and will continue to take medications as prescribed / participate in supportive activities and services    Status: Continued - Date(s): 9/26/2019    Objective #B: Patient will experience a reduction in anxiety, will develop more effective coping skills to manage anxiety symptoms, will develop healthy cognitive patterns and beliefs and will increase ability to function adaptively  Status: Continued  - Date(s): 9/26/2019    Objective #C: Patient will develop better understanding of triggers and coping strategies to stabilize mood  Status: Continued - Date(s): 9/26/2019    Goal 2:  Patient will identify and increase engagement in valued activity, i.e. improving social connections/relationships, pursuing occupational goals or personally meaningful pursuits, exploration of meaning in life.     Objective #A: Patient will identify meaningful activity in social, occupational and  personal goals, and increase behavioral activation around these goals   Status: Continued - Date(s): 9/26/2019    Objective #B: Patient will address relationship difficulties in a more adaptive manner  Status: Continued - Date(s): 9/26/2019    Objective #C: Patient will develop coping/problem-solving skills to facilitate more adaptive adjustment and will effectively address problems that interfere with adaptive functioning  Status: Continued - Date(s): 9/26/2019    Possible Therapeutic Intervention(s)  Psycho-education regarding mental health diagnoses and treatment options    Eye-Movement Desensitization and Reprocessing Therapy    Clinical Hypnosis    Skills training    Explore skills useful to client in current situation.    Skills include assertiveness, communication, conflict management, problem-solving, relaxation, etc.    Solution-Focused Therapy    Explore patterns in patient's relationships and discuss options for new behaviors.    Explore patterns in patient's actions and choices and discuss options for new behaviors.    Cognitive-behavioral Therapy    Discuss common cognitive distortions, identify them in patient's life.    Explore ways to challenge, replace, and act against these cognitions.    Acceptance and Commitment Therapy    Explore and identify important values in patient's life.    Discuss ways to commit to behavioral activation around these values.    Psychodynamic psychotherapy    Discuss patient's emotional dynamics and  issues and how they impact behaviors.    Explore patient's history of relationships and how they impact present behaviors.    Explore how to work with and make changes in these schemas and patterns.    Narrative Therapy    Explore the patient's story of his/her life from his/her perspective.    Explore alternate ways of understanding their experience, identifying exceptions, developing new themes.    Interpersonal Psychotherapy    Explore patterns in relationships that are effective or ineffective at helping patient reach their goals, find satisfying experience.    Discuss new patterns or behaviors to engage in for improved social functioning.    Behavioral Activation    Discuss steps patient can take to become more involved in meaningful activity.    Identify barriers to these activities and explore possible solutions.    Mindfulness-Based Strategies    Discuss skills based on development and application of mindfulness.    Skills drawn from compassion-focused therapy, dialectical behavior therapy, mindfulness-based stress reduction, mindfulness-based cognitive therapy, etc.      We have developed these goals together during our work to this point. Patient has assisted in the development of these goals and has agreed to this treatment plan.       KATHERINE French, [facility]  September 26, 2019

## 2020-04-15 ENCOUNTER — VIRTUAL VISIT (OUTPATIENT)
Dept: BEHAVIORAL HEALTH | Facility: CLINIC | Age: 25
End: 2020-04-15
Payer: COMMERCIAL

## 2020-04-15 DIAGNOSIS — F33.0 MAJOR DEPRESSIVE DISORDER, RECURRENT EPISODE, MILD (H): Primary | ICD-10-CM

## 2020-04-15 NOTE — PROGRESS NOTES
[facility] (PCC referral)  March 19, 2020  Video Visit      Behavioral Health Clinician Progress Note    Patient Name: Paris Reddy           Service Type:  Individual      Service Location:   Video Visit     Session Start Time: 310pm  Session End Time: 4pm      Session Length: 38 - 52      Attendees: Patient    Visit Activities (Refresh list every visit): [facility] Only    Diagnostic Assessment Date: 9/26/2019  Treatment Plan Review Date: 9/26/2019   CGI-S: 5    See Flowsheets for today's PHQ-9 and ANGUS-7 results  Previous PHQ-9:   PHQ-9 SCORE 8/27/2019 9/11/2019 12/4/2019   PHQ-9 Total Score MyChart - 9 (Mild depression) -   PHQ-9 Total Score 6 9 7     Previous ANGUS-7:   ANGUS-7 SCORE 8/27/2019 9/11/2019   Total Score - 6 (mild anxiety)   Total Score 3 6       PITER LEVEL:  No flowsheet data found.    DATA  Extended Session (60+ minutes): No  Interactive Complexity: No  Crisis: No  [facility] Patient: No    Treatment Objective(s) Addressed in This Session:  Target Behavior(s): disease management/lifestyle changes      Patient  will experience a reduction in depressed mood, will develop more effective coping skills to manage depressive symptoms, will develop healthy cognitive patterns and beliefs and will increase ability to function adaptively, will experience a reduction in anxiety, will develop more effective coping skills to manage anxiety symptoms, will develop healthy cognitive patterns and beliefs and will increase ability to function adaptively and will develop coping/problem-solving skills to facilitate more adaptive adjustment    Current Stressors / Issues:  Telemedicine Visit: The patient's condition can be safely assessed and treated via synchronous audio and visual telemedicine encounter.      Reason for Telemedicine Visit: Covid-19 situation    Originating Site (Patient Location): [facility]: [facility]    Distant Site (Provider Location): Provider Remote Setting    Consent:   The patient/guardian has verbally consented to: the potential risks and benefits of telemedicine (video visit) versus in person care; bill my insurance or make self-payment for services provided; and responsibility for payment of non-covered services.     Mode of Communication:  Video Conference via [facility]    As the provider I attest to compliance with applicable laws and regulations related to telemedicine.    [facility] met with Paris to provide psychotherapy support regarding anxiety and depression, life stress.    Paris reports there has been some stress in her personal life. She has had conflict with her boyfriend s mother. They had a difficult conversation that ended with her moving out. They had intended to move in May anyway, but this is stressful, nonetheless. Explored and processed her thoughts and feelings about this event, their relationship.     She is not happy to be back home, since her family is not really happy to see her there. This is sad for her and stressful. Her boyfriend cannot stay there because they are traditional and they don t want her to have her boyfriend over. She has feelings hurt from a variety of angles.  I am basically living out of a garbage bag at my mom s house.  This has been going on for a few weeks now.    She says she is still moving in a few weeks, and is looking forward to that. This process has been very stressful, however. She is frustrated that many of her goals are interrupted by the Covid-19 crisis.    Discussed coping mechanisms - she is using walks, guided meditations, spending time with her boyfriend as ways to manage. Her biggest hang up at the moment is about her boyfriend s mom s reaction to the situation that happened - which was basically a misunderstanding. She wanted to talk about how to let go of resentment. Discussed internal and external strategies she can try (the I AM [employer] it tool from previous conversations).     I affirmed the steps this patient  has taken to address physical and behavioral health issues, and offered continued behavioral health services or referral, now or in the future, as needed by the patient.    Progress on Treatment Objective(s) / Homework:  Satisfactory progress - ACTION (Actively working towards change); Intervened by reinforcing change plan / affirming steps taken    Interventions drawn from:  Psycho-education regarding mental health diagnoses and treatment options    Motivational Interviewing    Skills training    Explored specific skills useful to client in current situation    Skill areas include assertiveness, communication, conflict management, problem-solving, relaxation, etc.     Solution-Focused Therapy    Explored patterns in patient's behaviors and relationships and discussed options for new behaviors    Explored new options for problem-solving, communication, managing stress, etc.    Cognitive-behavioral Therapy    Discussed common cognitive distortions, identified them in patient's life    Explored ways to challenge, replace, and act against these cognitions    Explore behavioral changes that might benefit patient in improving mood and engage in meaningful activity    Acceptance and Commitment Therapy    Explored and identified important values in patient's life    Discussed ways to commit to behavioral activation around these values    Psychodynamic psychotherapy    Discussed patient's emotional dynamics and issues and how they impact behaviors    Explored patient's history of relationships and how they impact present behaviors    Explored how to work with and make changes in these schemas and patterns    Narrative Therapy    Explored the patient's story of their life from their perspective,     Explored alternate ways of understanding their experience, identifying exceptions, developing new themes    Existential Psychotherapy    Explored the patient's experience of an concerns about existential concerns: meaning,  freedom, purpose in life, life and death, etc,     Explored systems of meaning, methods for exploring these questions, resources for discussion, etc.    Interpersonal Psychotherapy    Explored patterns in relationships that are effective or ineffective at helping patient reach their goals, find satisfying experience.    Discussed new patterns or behaviors to engage in for improved social functioning.    Behavioral Activation    Discussed steps patient can take to become more involved in meaningful activity    Identified barriers to these activities and explored possible solutions    Mindfulness-Based Strategies    Discussed skills based on development and application of mindfulness    Skills drawn from compassion-focused therapy, dialectical behavior therapy, mindfulness-based stress reduction, mindfulness-based cognitive therapy, etc.    Medication Review:  No problems reported to [facility] today.    Medication Compliance:  No problems reported to [facility] today.    Changes in Health Issues:  No problems reported to [facility] today.    Chemical Use Review:   Substance Use: Chemical use reviewed, no active concerns identified      Tobacco Use: No current tobacco use.      Mental Status Assessment:  Appearance:   Appropriate   Eye Contact:   Good   Psychomotor Behavior: Normal   Attitude:   Cooperative   Orientation:   All  Speech   Rate / Production: Normal    Volume:  Normal   Mood:    Anxious  Depressed - increased again lately, per her report  Affect:    Appropriate  Subdued   Thought Content:  Clear  Rumination   Thought Form:  Coherent  Logical   Insight:    Good     Safety Assessment:  Patient has had a history of suicidal ideation: some passive thoughts when she was younger - no plans, no intent, no attempts  Patient denies current or recent suicidal ideation or behaviors.  Patient denies current or recent homicidal ideation or behaviors.  Patient denies current or recent self injurious behavior or ideation.  Patient denies  other safety concerns.  Patient reports there are no firearms in the house  Protective Factors Life Satisfaction, Reality testing ability, Positive coping skills, Positive problem-solving skills and Positive social support   Risk Factors Current high stress    Plan for Safety and Risk Management:  A safety and risk management plan has not been developed at this time, however patient was encouraged to call [place] [phone] should there be a change in any of these risk factors.    Diagnoses:  1. Major depressive disorder, recurrent episode, mild (H)    Consider developmental PTSD, ANGUS, eating disorder    Collateral Reports Completed:  Will collaborate with care team as indicated during treatment    Plan: (Homework, other):  Patient was given information about behavioral services and encouraged to schedule a follow up appointment with the clinic [facility] as needed.  She was also given information about mental health symptoms and treatment options  and Cognitive Behavioral Therapy skills to practice when experiencing anxiety and depression.  CD Recommendations: No indications of CD issues. We agree to begin a course of psychotherapy to address anxiety, depression, stress.  KATHERINE Neal, [facility]    ______________________________________________________________________    [facility] Integrated Behavioral Health Treatment Plan    Client's Name: Paris Reddy  YOB: 1995    Date: 9/26/2019    DSM5 Diagnoses: (Sustained by DSM5 Criteria Listed Above)  Diagnoses: 296.22 (F32.1)  Major Depressive Disorder, Single Episode, Moderate With anxious distress  Psychosocial & Contextual Factors: social stressors, possible hx of childhood trauma  WHODAS Score: 20  See Media section of EPIC medical record for completed WHODAS  CGI-S: 5    Referral / Collaboration:  Will collaborate with care team as indicated during treatment.    Anticipated number of session or this episode of care: 5-10      MeasurableTreatment Goal(s)  related to diagnosis / functional impairment(s)  Goal 1:  Patient will experience a reduction in depressive and anxious symptoms, along with a corresponding increase in positive emotion and life satisfaction.    Objective #A: Patient will experience a reduction in depressed mood, will develop more effective coping skills to manage depressive symptoms, will develop healthy cognitive patterns and beliefs, will increase ability to function adaptively and will continue to take medications as prescribed / participate in supportive activities and services    Status: Continued - Date(s): 9/26/2019    Objective #B: Patient will experience a reduction in anxiety, will develop more effective coping skills to manage anxiety symptoms, will develop healthy cognitive patterns and beliefs and will increase ability to function adaptively  Status: Continued - Date(s): 9/26/2019    Objective #C: Patient will develop better understanding of triggers and coping strategies to stabilize mood  Status: Continued - Date(s): 9/26/2019    Goal 2:  Patient will identify and increase engagement in valued activity, i.e. improving social connections/relationships, pursuing occupational goals or personally meaningful pursuits, exploration of meaning in life.     Objective #A: Patient will identify meaningful activity in social, occupational and  personal goals, and increase behavioral activation around these goals   Status: Continued - Date(s): 9/26/2019    Objective #B: Patient will address relationship difficulties in a more adaptive manner  Status: Continued - Date(s): 9/26/2019    Objective #C: Patient will develop coping/problem-solving skills to facilitate more adaptive adjustment and will effectively address problems that interfere with adaptive functioning  Status: Continued - Date(s): 9/26/2019    Possible Therapeutic Intervention(s)  Psycho-education regarding mental health diagnoses and treatment options    Eye-Movement Desensitization  and Reprocessing Therapy    Clinical Hypnosis    Skills training    Explore skills useful to client in current situation.    Skills include assertiveness, communication, conflict management, problem-solving, relaxation, etc.    Solution-Focused Therapy    Explore patterns in patient's relationships and discuss options for new behaviors.    Explore patterns in patient's actions and choices and discuss options for new behaviors.    Cognitive-behavioral Therapy    Discuss common cognitive distortions, identify them in patient's life.    Explore ways to challenge, replace, and act against these cognitions.    Acceptance and Commitment Therapy    Explore and identify important values in patient's life.    Discuss ways to commit to behavioral activation around these values.    Psychodynamic psychotherapy    Discuss patient's emotional dynamics and issues and how they impact behaviors.    Explore patient's history of relationships and how they impact present behaviors.    Explore how to work with and make changes in these schemas and patterns.    Narrative Therapy    Explore the patient's story of his/her life from his/her perspective.    Explore alternate ways of understanding their experience, identifying exceptions, developing new themes.    Interpersonal Psychotherapy    Explore patterns in relationships that are effective or ineffective at helping patient reach their goals, find satisfying experience.    Discuss new patterns or behaviors to engage in for improved social functioning.    Behavioral Activation    Discuss steps patient can take to become more involved in meaningful activity.    Identify barriers to these activities and explore possible solutions.    Mindfulness-Based Strategies    Discuss skills based on development and application of mindfulness.    Skills drawn from compassion-focused therapy, dialectical behavior therapy, mindfulness-based stress reduction, mindfulness-based cognitive therapy,  etc.      We have developed these goals together during our work to this point. Patient has assisted in the development of these goals and has agreed to this treatment plan.       KATHERINE French, [facility]  September 26, 2019

## 2020-05-04 ENCOUNTER — VIRTUAL VISIT (OUTPATIENT)
Dept: BEHAVIORAL HEALTH | Facility: CLINIC | Age: 25
End: 2020-05-04
Payer: COMMERCIAL

## 2020-05-04 DIAGNOSIS — F33.0 MAJOR DEPRESSIVE DISORDER, RECURRENT EPISODE, MILD (H): Primary | ICD-10-CM

## 2020-05-04 DIAGNOSIS — F50.819 BINGE EATING DISORDER: ICD-10-CM

## 2020-05-04 DIAGNOSIS — F33.0 MILD EPISODE OF RECURRENT MAJOR DEPRESSIVE DISORDER (H): ICD-10-CM

## 2020-05-04 RX ORDER — LISDEXAMFETAMINE DIMESYLATE 30 MG/1
30 CAPSULE ORAL DAILY
Qty: 30 CAPSULE | Refills: 0 | Status: SHIPPED | OUTPATIENT
Start: 2020-05-04 | End: 2020-06-12

## 2020-05-04 RX ORDER — LISDEXAMFETAMINE DIMESYLATE 30 MG/1
30 CAPSULE ORAL EVERY MORNING
Qty: 30 CAPSULE | Refills: 0 | Status: SHIPPED | OUTPATIENT
Start: 2020-06-03 | End: 2020-06-04

## 2020-05-04 RX ORDER — SERTRALINE HYDROCHLORIDE 25 MG/1
25 TABLET, FILM COATED ORAL DAILY
Qty: 90 TABLET | Refills: 3 | Status: SHIPPED | OUTPATIENT
Start: 2020-05-04 | End: 2021-01-19

## 2020-05-04 RX ORDER — LISDEXAMFETAMINE DIMESYLATE 30 MG/1
30 CAPSULE ORAL DAILY
Qty: 30 CAPSULE | Refills: 0 | Status: SHIPPED | OUTPATIENT
Start: 2020-07-03 | End: 2020-06-04

## 2020-05-04 NOTE — PROGRESS NOTES
"[facility] (PCC referral)  May 4, 2020  Telephone Visit      Behavioral Health Clinician Progress Note    Patient Name: Paris Reddy           Service Type:  Individual      Service Location:   TelephoneVisit     Session Start Time: 1215pm  Session End Time: 1255pm      Session Length: 16 - 37      Attendees: Patient    Visit Activities (Refresh list every visit): [facility] Only and Phone Encounter    Diagnostic Assessment Date: 9/26/2019  Treatment Plan Review Date: 5/1/2020   CGI-S: 4   CGI-I 2    See Flowsheets for today's PHQ-9 and ANGUS-7 results  Previous PHQ-9:   PHQ-9 SCORE 8/27/2019 9/11/2019 12/4/2019   PHQ-9 Total Score MyChart - 9 (Mild depression) -   PHQ-9 Total Score 6 9 7     Previous ANGUS-7:   ANGUS-7 SCORE 8/27/2019 9/11/2019   Total Score - 6 (mild anxiety)   Total Score 3 6       PITER LEVEL:  No flowsheet data found.    DATA  Extended Session (60+ minutes): No  Interactive Complexity: No  Crisis: No  [facility] Patient: No    Treatment Objective(s) Addressed in This Session:  Target Behavior(s): disease management/lifestyle changes      Patient  will experience a reduction in depressed mood, will develop more effective coping skills to manage depressive symptoms, will develop healthy cognitive patterns and beliefs and will increase ability to function adaptively, will experience a reduction in anxiety, will develop more effective coping skills to manage anxiety symptoms, will develop healthy cognitive patterns and beliefs and will increase ability to function adaptively and will develop coping/problem-solving skills to facilitate more adaptive adjustment    Current Stressors / Issues:  Paris Reddy is a 24 year old female who is being evaluated via a telephone visit.      The patient has been notified of the following:     \"We have found that certain health care needs can be provided without the need for a face to face visit.  This service lets us provide the care you need with " "a short phone conversation.      I will have full access to your [facility] medical record during this entire phone call.   I will be taking notes for your medical record.     Since this is like an office visit, we will bill your insurance company for this service.  Please check with your medical insurance if this type of telephone visit/virtual care is covered.  You may be responsible for the cost of this service if insurance coverage is denied.      There are potential benefits and risks of telephone visits (e.g. limits to patient confidentiality) that differ from in-person visits.?  Confidentiality still applies for telephone services, and nobody will record the visit.  It is important to be in a quiet, private space that is free of distractions (including cell phone or other devices) during the visit.??     If during the course of the call I believe a telephone visit is not appropriate, you will not be charged for this service\"    Consent has been obtained for this service by care team member: yes.    [facility] met with Paris to provide psychotherapy support regarding anxiety and depression, life stress. We attempted a video call today but the technology did not work, so we switched to a telephone call.    Paris reports that she and her boyfriend have moved and are rally enjoying having their own space and freedom from their families. The situation with her boyfriend's mom has not changed, but she is more at peace with it, she says.    We explored her strengths and what has gone well, what she has done well, to this point.     Also re-visited her goals of health and weight loss. Explored her current struggles, especially with the Covid-19 crisis. Explored what has worked what she would like. Problem-solving, solution-focused approach - talked about healthy weight loss and eating, exercise. Addressed this from a SMART goals perspective and she agreed to work on this process about eating and exercise goals before our " next visit.    I have reviewed the treatment plan today. Paris has made good progress towards these goals. They remain appropriate to direct our work together.     I affirmed the steps this patient has taken to address physical and behavioral health issues, and offered continued behavioral health services or referral, now or in the future, as needed by the patient.    Progress on Treatment Objective(s) / Homework:  Satisfactory progress - ACTION (Actively working towards change); Intervened by reinforcing change plan / affirming steps taken    Interventions drawn from:  Psycho-education regarding mental health diagnoses and treatment options    Motivational Interviewing    Skills training    Explored specific skills useful to client in current situation    Skill areas include assertiveness, communication, conflict management, problem-solving, relaxation, etc.     Solution-Focused Therapy    Explored patterns in patient's behaviors and relationships and discussed options for new behaviors    Explored new options for problem-solving, communication, managing stress, etc.    Cognitive-behavioral Therapy    Discussed common cognitive distortions, identified them in patient's life    Explored ways to challenge, replace, and act against these cognitions    Explore behavioral changes that might benefit patient in improving mood and engage in meaningful activity    Acceptance and Commitment Therapy    Explored and identified important values in patient's life    Discussed ways to commit to behavioral activation around these values    Psychodynamic psychotherapy    Discussed patient's emotional dynamics and issues and how they impact behaviors    Explored patient's history of relationships and how they impact present behaviors    Explored how to work with and make changes in these schemas and patterns    Narrative Therapy    Explored the patient's story of their life from their perspective,     Explored alternate ways of  understanding their experience, identifying exceptions, developing new themes    Existential Psychotherapy    Explored the patient's experience of an concerns about existential concerns: meaning, freedom, purpose in life, life and death, etc,     Explored systems of meaning, methods for exploring these questions, resources for discussion, etc.    Interpersonal Psychotherapy    Explored patterns in relationships that are effective or ineffective at helping patient reach their goals, find satisfying experience.    Discussed new patterns or behaviors to engage in for improved social functioning.    Behavioral Activation    Discussed steps patient can take to become more involved in meaningful activity    Identified barriers to these activities and explored possible solutions    Mindfulness-Based Strategies    Discussed skills based on development and application of mindfulness    Skills drawn from compassion-focused therapy, dialectical behavior therapy, mindfulness-based stress reduction, mindfulness-based cognitive therapy, etc.    Medication Review:  No problems reported to [facility] today.    Medication Compliance:  No problems reported to [facility] today.    Changes in Health Issues:  No problems reported to [facility] today.    Chemical Use Review:   Substance Use: Chemical use reviewed, no active concerns identified      Tobacco Use: No current tobacco use.      Mental Status Assessment:  Appearance:   Appropriate   Eye Contact:   Good   Psychomotor Behavior: Normal   Attitude:   Cooperative   Orientation:   All  Speech   Rate / Production: Normal    Volume:  Normal   Mood:    Anxious  Depressed - increased again lately, per her report  Affect:    Appropriate  Subdued   Thought Content:  Clear  Rumination   Thought Form:  Coherent  Logical   Insight:    Good     Safety Assessment:  Patient has had a history of suicidal ideation: some passive thoughts when she was younger - no plans, no intent, no attempts  Patient denies current  or recent suicidal ideation or behaviors.  Patient denies current or recent homicidal ideation or behaviors.  Patient denies current or recent self injurious behavior or ideation.  Patient denies other safety concerns.  Patient reports there are no firearms in the house  Protective Factors Life Satisfaction, Reality testing ability, Positive coping skills, Positive problem-solving skills and Positive social support   Risk Factors Current high stress    Plan for Safety and Risk Management:  A safety and risk management plan has not been developed at this time, however patient was encouraged to call [place] [phone] should there be a change in any of these risk factors.    Diagnoses:  1. Major depressive disorder, recurrent episode, mild (H)      Collateral Reports Completed:  Will collaborate with care team as indicated during treatment    Plan: (Homework, other):  Patient was given information about behavioral services and encouraged to schedule a follow up appointment with the clinic [facility] as needed.  She was also given information about mental health symptoms and treatment options  and Cognitive Behavioral Therapy skills to practice when experiencing anxiety and depression.  CD Recommendations: No indications of CD issues. We agree to begin a course of psychotherapy to address anxiety, depression, stress.  KATHERINE Neal, [facility]    ______________________________________________________________________    [facility] Integrated Behavioral Health Treatment Plan    Client's Name: Paris Reddy  YOB: 1995    Date: 5/1/2020    DSM5 Diagnoses: (Sustained by DSM5 Criteria Listed Above)  Diagnoses: 296.22 (F32.1)  Major Depressive Disorder, Single Episode, Moderate With anxious distress  Psychosocial & Contextual Factors: social stressors, possible hx of childhood trauma  WHODAS Score: 20  See Media section of EPIC medical record for completed WHODAS  CGI-S: 4  CGI-I: 2    Referral / Collaboration:  Will  collaborate with care team as indicated during treatment.    Anticipated number of session or this episode of care: 5-10      MeasurableTreatment Goal(s) related to diagnosis / functional impairment(s)  Goal 1:  Patient will experience a reduction in depressive and anxious symptoms, along with a corresponding increase in positive emotion and life satisfaction.    Objective #A: Patient will experience a reduction in depressed mood, will develop more effective coping skills to manage depressive symptoms, will develop healthy cognitive patterns and beliefs, will increase ability to function adaptively and will continue to take medications as prescribed / participate in supportive activities and services    Status: Continued - Date(s): 5/1/2020    Objective #B: Patient will experience a reduction in anxiety, will develop more effective coping skills to manage anxiety symptoms, will develop healthy cognitive patterns and beliefs and will increase ability to function adaptively  Status: Continued - Date(s): 5/1/2020    Objective #C: Patient will develop better understanding of triggers and coping strategies to stabilize mood  Status: Continued - Date(s): 5/1/2020    Goal 2:  Patient will identify and increase engagement in valued activity, i.e. improving social connections/relationships, pursuing occupational goals or personally meaningful pursuits, exploration of meaning in life.     Objective #A: Patient will identify meaningful activity in social, occupational and  personal goals, and increase behavioral activation around these goals   Status: Continued - Date(s): 5/1/2020    Objective #B: Patient will address relationship difficulties in a more adaptive manner  Status: Continued - Date(s): 5/1/2020    Objective #C: Patient will develop coping/problem-solving skills to facilitate more adaptive adjustment and will effectively address problems that interfere with adaptive functioning  Status: Continued - Date(s):  5/1/2020    Possible Therapeutic Intervention(s)  Psycho-education regarding mental health diagnoses and treatment options    Eye-Movement Desensitization and Reprocessing Therapy    Clinical Hypnosis    Skills training    Explore skills useful to client in current situation.    Skills include assertiveness, communication, conflict management, problem-solving, relaxation, etc.    Solution-Focused Therapy    Explore patterns in patient's relationships and discuss options for new behaviors.    Explore patterns in patient's actions and choices and discuss options for new behaviors.    Cognitive-behavioral Therapy    Discuss common cognitive distortions, identify them in patient's life.    Explore ways to challenge, replace, and act against these cognitions.    Acceptance and Commitment Therapy    Explore and identify important values in patient's life.    Discuss ways to commit to behavioral activation around these values.    Psychodynamic psychotherapy    Discuss patient's emotional dynamics and issues and how they impact behaviors.    Explore patient's history of relationships and how they impact present behaviors.    Explore how to work with and make changes in these schemas and patterns.    Narrative Therapy    Explore the patient's story of his/her life from his/her perspective.    Explore alternate ways of understanding their experience, identifying exceptions, developing new themes.    Interpersonal Psychotherapy    Explore patterns in relationships that are effective or ineffective at helping patient reach their goals, find satisfying experience.    Discuss new patterns or behaviors to engage in for improved social functioning.    Behavioral Activation    Discuss steps patient can take to become more involved in meaningful activity.    Identify barriers to these activities and explore possible solutions.    Mindfulness-Based Strategies    Discuss skills based on development and application of  mindfulness.    Skills drawn from compassion-focused therapy, dialectical behavior therapy, mindfulness-based stress reduction, mindfulness-based cognitive therapy, etc.      We have developed these goals together during our work to this point. Patient has assisted in the development of these goals and has agreed to this treatment plan.       Renato Maloney, KATHERINE, [facility] [place]  5/1/2020

## 2020-06-04 DIAGNOSIS — F50.819 BINGE EATING DISORDER: ICD-10-CM

## 2020-06-04 RX ORDER — LISDEXAMFETAMINE DIMESYLATE 30 MG/1
30 CAPSULE ORAL DAILY
Qty: 30 CAPSULE | Refills: 0 | Status: SHIPPED | OUTPATIENT
Start: 2020-07-03 | End: 2020-06-12

## 2020-06-04 RX ORDER — LISDEXAMFETAMINE DIMESYLATE 30 MG/1
30 CAPSULE ORAL EVERY MORNING
Qty: 30 CAPSULE | Refills: 0 | Status: SHIPPED | OUTPATIENT
Start: 2020-06-04 | End: 2020-06-12

## 2020-06-12 ENCOUNTER — VIRTUAL VISIT (OUTPATIENT)
Dept: INTERNAL MEDICINE | Facility: CLINIC | Age: 25
End: 2020-06-12
Payer: COMMERCIAL

## 2020-06-12 ENCOUNTER — TELEPHONE (OUTPATIENT)
Dept: INTERNAL MEDICINE | Facility: CLINIC | Age: 25
End: 2020-06-12

## 2020-06-12 DIAGNOSIS — F50.819 BINGE EATING DISORDER: ICD-10-CM

## 2020-06-12 RX ORDER — LISDEXAMFETAMINE DIMESYLATE 30 MG/1
30 CAPSULE ORAL EVERY MORNING
Qty: 30 CAPSULE | Refills: 0 | Status: SHIPPED | OUTPATIENT
Start: 2020-08-02 | End: 2020-10-13

## 2020-06-12 RX ORDER — LISDEXAMFETAMINE DIMESYLATE 30 MG/1
30 CAPSULE ORAL DAILY
Qty: 30 CAPSULE | Refills: 0 | Status: SHIPPED | OUTPATIENT
Start: 2020-07-03 | End: 2020-10-13

## 2020-06-12 RX ORDER — LISDEXAMFETAMINE DIMESYLATE 30 MG/1
30 CAPSULE ORAL DAILY
Qty: 30 CAPSULE | Refills: 0 | Status: SHIPPED | OUTPATIENT
Start: 2020-09-01 | End: 2020-10-13

## 2020-06-12 NOTE — PROGRESS NOTES
"Paris Reddy is a 24 year old female who is being evaluated via a billable telephone visit.      The patient has been notified of following:     \"This telephone visit will be conducted via a call between you and your physician/provider. We have found that certain health care needs can be provided without the need for a physical exam.  This service lets us provide the care you need with a short phone conversation.  If a prescription is necessary we can send it directly to your pharmacy.  If lab work is needed we can place an order for that and you can then stop by our lab to have the test done at a later time.    Telephone visits are billed at different rates depending on your insurance coverage. During this emergency period, for some insurers they may be billed the same as an in-person visit.  Please reach out to your insurance provider with any questions.    If during the course of the call the physician/provider feels a telephone visit is not appropriate, you will not be charged for this service.\"    Patient has given verbal consent for Telephone visit?  Yes    What phone number would you like to be contacted at? [phone]     How would you like to obtain your AVS? [employer]    Subjective     Paris Reddy is a 24 year old female who presents via phone visit today for the following health issues:    HPI    In general feels doing okay, everything feels \"a little on hold\" d/t COVID. Was doing really well prior to the pandemic was setting up an exercise routine and getting better at following through with it, but now with everything at the gym closed, has been doing daily walks. Has difficulty doing exercises at home.   Doing okay in terms of eating--Working from home--more difficult to control the food she eats, trying to limit what she buys to only have certain foods in the house. Weight has been stable. Hopeful that the gym might be opening again soon.  Has followed up with Chucho Maloney for therapy, " will reschedule in the future.  Feels current regimen is overall helping, upwards trend, but sort of hit a plateau d/t the pandemic.     -------------------------------------    Patient Active Problem List   Diagnosis     Mild episode of recurrent major depressive disorder (H)     Obesity (BMI 30-39.9)     Vitamin D deficiency     IUD (intrauterine device) in place     Past Surgical History:   Procedure Laterality Date     CHOLECYSTECTOMY  2018       Social History     Tobacco Use     Smoking status: Never Smoker     Smokeless tobacco: Never Used   Substance Use Topics     Alcohol use: Yes     Frequency: 2-4 times a month     Comment: social     Family History   Problem Relation Age of Onset     Cervical Cancer Mother         HPV+     Migraines Mother          Current Outpatient Medications   Medication Sig Dispense Refill     levonorgestrel (LILETTA, 52 MG,) 19.5 MCG/DAY IUD 1 each (19.5 mcg) by Intrauterine route once for 1 dose Due to remove in 2020       lisdexamfetamine (VYVANSE) 30 MG capsule Take 1 capsule (30 mg) by mouth every morning 30 capsule 0     [START ON 7/3/2020] lisdexamfetamine (VYVANSE) 30 MG capsule Take 1 capsule (30 mg) by mouth daily 30 capsule 0     lisdexamfetamine (VYVANSE) 30 MG capsule Take 1 capsule (30 mg) by mouth daily 30 capsule 0     ondansetron (ZOFRAN-ODT) 4 MG ODT tab Take 1-2 tablets (4-8 mg) by mouth every 8 hours as needed for nausea 20 tablet 0     order for DME Equipment being ordered: SAD Light Box, 10,000 Lux strength 1 each 0     sertraline (ZOLOFT) 25 MG tablet Take 1 tablet (25 mg) by mouth daily 90 tablet 3     SUMAtriptan (IMITREX) 50 MG tablet Take 1 tablet (50 mg) by mouth at onset of headache for migraine May repeat in 2 hours. Max 4 tablets/24 hours. 9 tablet 0     valACYclovir (VALTREX) 500 MG tablet Take 1 tablet (500 mg) by mouth 2 times daily for 5 days 10 tablet 0       Reviewed and updated as needed this visit by Provider         Review of Systems    Constitutional, HEENT, cardiovascular, pulmonary, gi and gu systems are negative, except as otherwise noted.       Objective   Reported vitals:  There were no vitals taken for this visit.   healthy, alert and no distress  PSYCH: Alert and oriented times 3; coherent speech, normal   rate and volume, able to articulate logical thoughts, able   to abstract reason, no tangential thoughts, no hallucinations   or delusions  Her affect is pleasant  RESP: No cough, no audible wheezing, able to talk in full sentences  Remainder of exam unable to be completed due to telephone visits    Diagnostic Test Results:  none         Assessment/Plan:  1. Binge eating disorder  Refill provided, no changes needed at this time. Continue working on stress management, and healthy lifestyle with regular exercise/walking and careful shopping for healthy food options at home. F/u with Chucho Maloney for therapy when able to reschedule. Continue with Zoloft at current dose.   - lisdexamfetamine (VYVANSE) 30 MG capsule; Take 1 capsule (30 mg) by mouth daily  Dispense: 30 capsule; Refill: 0  - lisdexamfetamine (VYVANSE) 30 MG capsule; Take 1 capsule (30 mg) by mouth every morning  Dispense: 30 capsule; Refill: 0  - lisdexamfetamine (VYVANSE) 30 MG capsule; Take 1 capsule (30 mg) by mouth daily  Dispense: 30 capsule; Refill: 0    Follow-up in approx 6 months, sooner as needed for any changes or concerns    Phone call duration:  10 minutes    SHAKIRA Pulido CNP

## 2020-06-12 NOTE — TELEPHONE ENCOUNTER
Message sent via my chart to schedule appointment AVS sent via my chart  Colleen Perea CMA at 2:59 PM on 6/12/2020.

## 2020-06-12 NOTE — NURSING NOTE
Chief Complaint   Patient presents with     Recheck Medication     Pt would like to discuss medications      Ольга Segura EMT at 11:03 AM sign on 6/12/2020

## 2020-08-10 ENCOUNTER — OFFICE VISIT (OUTPATIENT)
Dept: URGENT CARE | Facility: URGENT CARE | Age: 25
End: 2020-08-10
Payer: COMMERCIAL

## 2020-08-10 VITALS
TEMPERATURE: 98.3 F | BODY MASS INDEX: 39.27 KG/M2 | HEIGHT: 64 IN | WEIGHT: 230 LBS | HEART RATE: 72 BPM | SYSTOLIC BLOOD PRESSURE: 108 MMHG | DIASTOLIC BLOOD PRESSURE: 70 MMHG

## 2020-08-10 DIAGNOSIS — B09 VIRAL RASH: Primary | ICD-10-CM

## 2020-08-10 DIAGNOSIS — B00.9 HSV (HERPES SIMPLEX VIRUS) INFECTION: ICD-10-CM

## 2020-08-10 PROCEDURE — 99213 OFFICE O/P EST LOW 20 MIN: CPT | Performed by: NURSE PRACTITIONER

## 2020-08-10 RX ORDER — VALACYCLOVIR HYDROCHLORIDE 1 G/1
1000 TABLET, FILM COATED ORAL DAILY
Qty: 5 TABLET | Refills: 0 | Status: SHIPPED | OUTPATIENT
Start: 2020-08-10 | End: 2021-10-19

## 2020-08-10 ASSESSMENT — ENCOUNTER SYMPTOMS
NAUSEA: 0
SLEEP DISTURBANCE: 0
APPETITE CHANGE: 0
MYALGIAS: 0
NUMBNESS: 0
DIAPHORESIS: 0
ARTHRALGIAS: 0
ABDOMINAL PAIN: 0
NERVOUS/ANXIOUS: 0
WEAKNESS: 0
ACTIVITY CHANGE: 0
FEVER: 0
CHILLS: 0
FATIGUE: 0
PALPITATIONS: 0
COLOR CHANGE: 1
VOMITING: 0
PARESTHESIAS: 0

## 2020-08-10 ASSESSMENT — MIFFLIN-ST. JEOR: SCORE: 1773.27

## 2020-08-11 NOTE — PROGRESS NOTES
Chief Complaint   Patient presents with     Urgent Care     Derm Problem     rash on chin that she gets about twice a year.      SUBJECTIVE:  Paris Reddy is a 24 year old female who presents to the clinic today for a rash.  Onset of rash was 1 day(s) ago.   Rash is gradual onset.   Location of the rash: left chin  Quality/symptoms of rash: itching, hot, irritated, tiny clear filled vesicles  Associated symptoms include: nothing.  Symptoms are moderate and rash seems to be worsening.  Previous history of a similar rash? Yes: she gets herpetic viral rashes like this 1-2x a year and benefits from antivirals. She never had lesions cultured or HSV testing done. Antibiotics made it worse previously.  Treatment measures tried include:  none  Recent exposure history: none known  Patient denies new meds, pets, foods, soaps, detergents, lotions, or enviornmental contacts. She has been stressed.    Past Medical History:   Diagnosis Date     Migraines      levonorgestrel (LILETTA, 52 MG,) 19.5 MCG/DAY IUD, 1 each (19.5 mcg) by Intrauterine route once for 1 dose Due to remove in 2020  [START ON 9/1/2020] lisdexamfetamine (VYVANSE) 30 MG capsule, Take 1 capsule (30 mg) by mouth daily  sertraline (ZOLOFT) 25 MG tablet, Take 1 tablet (25 mg) by mouth daily  lisdexamfetamine (VYVANSE) 30 MG capsule, Take 1 capsule (30 mg) by mouth every morning  lisdexamfetamine (VYVANSE) 30 MG capsule, Take 1 capsule (30 mg) by mouth daily  ondansetron (ZOFRAN-ODT) 4 MG ODT tab, Take 1-2 tablets (4-8 mg) by mouth every 8 hours as needed for nausea  order for DME, Equipment being ordered: SAD Light Box, 10,000 Lux strength  SUMAtriptan (IMITREX) 50 MG tablet, Take 1 tablet (50 mg) by mouth at onset of headache for migraine May repeat in 2 hours. Max 4 tablets/24 hours.  valACYclovir (VALTREX) 500 MG tablet, Take 1 tablet (500 mg) by mouth 2 times daily for 5 days    No current facility-administered medications on file prior to visit.  "    Social History     Tobacco Use     Smoking status: Never Smoker     Smokeless tobacco: Never Used   Substance Use Topics     Alcohol use: Yes     Frequency: 2-4 times a month     Comment: social     No Known Allergies    Review of Systems   Constitutional: Negative for activity change, appetite change, chills, diaphoresis, fatigue and fever.   Cardiovascular: Negative for chest pain, palpitations and peripheral edema.   Gastrointestinal: Negative for abdominal pain, nausea and vomiting.   Musculoskeletal: Negative for arthralgias and myalgias.   Skin: Positive for color change and rash.   Neurological: Negative for weakness, numbness and paresthesias.   Psychiatric/Behavioral: Negative for sleep disturbance. The patient is not nervous/anxious.      EXAM:   /70   Pulse 72   Temp 98.3  F (36.8  C) (Oral)   Ht 1.626 m (5' 4\")   Wt 104.3 kg (230 lb)   Breastfeeding No   BMI 39.48 kg/m      Physical Exam  Vitals signs reviewed.   Constitutional:       Appearance: Normal appearance.   HENT:      Head: Normocephalic and atraumatic.   Cardiovascular:      Rate and Rhythm: Normal rate.   Pulmonary:      Effort: Pulmonary effort is normal.   Musculoskeletal: Normal range of motion.   Skin:     General: Skin is warm and dry.      Findings: Erythema and rash present.      Comments: Tiny clear filled vesicles over left chin. Herpetic appearing.   Neurological:      General: No focal deficit present.      Mental Status: She is alert and oriented to person, place, and time.   Psychiatric:         Mood and Affect: Mood normal.         Behavior: Behavior normal.       ASSESSMENT:    ICD-10-CM    1. Viral rash  B09 valACYclovir (VALTREX) 1000 mg tablet   2. HSV (herpes simplex virus) infection  B00.9 valACYclovir (VALTREX) 1000 mg tablet     PLAN:  Patient Instructions   Valtrex once daily per request  Declined culture and HSV testing  Reeval if worsening redness, warmth, thick drainage, fever, lymph nodes    Follow " up with primary care provider with any problems, questions or concerns or if symptoms worsen or fail to improve. Patient agreed to plan and verbalized understanding.    Abbie Kulkarni, [facility]

## 2020-08-11 NOTE — PATIENT INSTRUCTIONS
Valtrex once daily per request  Declined culture and HSV testing  Reeval if worsening redness, warmth, thick drainage, fever, lymph nodes

## 2020-08-17 ENCOUNTER — VIRTUAL VISIT (OUTPATIENT)
Dept: BEHAVIORAL HEALTH | Facility: CLINIC | Age: 25
End: 2020-08-17
Payer: COMMERCIAL

## 2020-08-17 DIAGNOSIS — F33.0 MAJOR DEPRESSIVE DISORDER, RECURRENT EPISODE, MILD (H): Primary | ICD-10-CM

## 2020-08-17 ASSESSMENT — ANXIETY QUESTIONNAIRES
1. FEELING NERVOUS, ANXIOUS, OR ON EDGE: SEVERAL DAYS
6. BECOMING EASILY ANNOYED OR IRRITABLE: SEVERAL DAYS
7. FEELING AFRAID AS IF SOMETHING AWFUL MIGHT HAPPEN: NOT AT ALL
GAD7 TOTAL SCORE: 3
5. BEING SO RESTLESS THAT IT IS HARD TO SIT STILL: NOT AT ALL
GAD7 TOTAL SCORE: 3
3. WORRYING TOO MUCH ABOUT DIFFERENT THINGS: SEVERAL DAYS
2. NOT BEING ABLE TO STOP OR CONTROL WORRYING: NOT AT ALL
7. FEELING AFRAID AS IF SOMETHING AWFUL MIGHT HAPPEN: NOT AT ALL
GAD7 TOTAL SCORE: 3
4. TROUBLE RELAXING: NOT AT ALL

## 2020-08-17 ASSESSMENT — PATIENT HEALTH QUESTIONNAIRE - PHQ9
10. IF YOU CHECKED OFF ANY PROBLEMS, HOW DIFFICULT HAVE THESE PROBLEMS MADE IT FOR YOU TO DO YOUR WORK, TAKE CARE OF THINGS AT HOME, OR GET ALONG WITH OTHER PEOPLE: SOMEWHAT DIFFICULT
SUM OF ALL RESPONSES TO PHQ QUESTIONS 1-9: 5
SUM OF ALL RESPONSES TO PHQ QUESTIONS 1-9: 5

## 2020-08-17 NOTE — PROGRESS NOTES
[facility] (PCC referral)  August 17, 2020  Video Visit      Behavioral Health Clinician Progress Note  Video Visit    Patient Name: Paris Reddy           Service Type:  Individual      Service Location:   Video Visit     Session Start Time: 1105am  Session End Time: 1205pm      Session Length: 53 - 60      Attendees: Patient    Visit Activities (Refresh list every visit): [facility] Only    Diagnostic Assessment Date: 9/26/2019  Treatment Plan Review Date: 5/1/2020   CGI-S: 4   CGI-I 2    See Flowsheets for today's PHQ-9 and ANGUS-7 results  Previous PHQ-9:   PHQ-9 SCORE 9/11/2019 12/4/2019 8/17/2020   PHQ-9 Total Score MyChart 9 (Mild depression) - 5 (Mild depression)   PHQ-9 Total Score 9 7 5     Previous ANGUS-7:   ANGUS-7 SCORE 8/27/2019 9/11/2019 8/17/2020   Total Score - 6 (mild anxiety) 3 (minimal anxiety)   Total Score 3 6 3       PITER LEVEL:  No flowsheet data found.    DATA  Extended Session (60+ minutes): No  Interactive Complexity: No  Crisis: No  [facility] Patient: No    Treatment Objective(s) Addressed in This Session:  Target Behavior(s): disease management/lifestyle changes      Patient  will experience a reduction in depressed mood, will develop more effective coping skills to manage depressive symptoms, will develop healthy cognitive patterns and beliefs and will increase ability to function adaptively, will experience a reduction in anxiety, will develop more effective coping skills to manage anxiety symptoms, will develop healthy cognitive patterns and beliefs and will increase ability to function adaptively and will develop coping/problem-solving skills to facilitate more adaptive adjustment    Current Stressors / Issues:  Telemedicine Visit: The patient's condition can be safely assessed and treated via synchronous audio and visual telemedicine encounter.      Reason for Telemedicine Visit: Covid-19 pandemic    Originating Site (Patient Location): Patient's home    Distant Site  (Provider Location): Provider Remote Setting    Consent:  The patient/guardian has verbally consented to: the potential risks and benefits of telemedicine (video visit) versus in person care; bill my insurance or make self-payment for services provided; and responsibility for payment of non-covered services.     Mode of Communication:  Video Conference via [email or web address]    As the provider I attest to compliance with applicable laws and regulations related to telemedicine.    We had to switch over to a telephone call about nursing home through the session due to some technology difficulties.      [facility] met with Paris to provide psychotherapy support regarding anxiety and depression, life stress. We attempted a video call today but the technology did not work, so we switched to a telephone call.    Answers for HPI/ROS submitted by the patient on 8/17/2020   If you checked off any problems, how difficult have these problems made it for you to do your work, take care of things at home, or get along with other people?: Somewhat difficult  PHQ9 TOTAL SCORE: 5  ANGUS 7 TOTAL SCORE: 3    We spent our session catching up on how she has been since our last visit, current/new stressors, etc.    Missy reports that she has been moving into a newer position at the [employer] and having some stress related to this work. This has caused her to reconsider her general career direction,which is scary, a bit. 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 Followed this into a general conversation about living with the ambiguity of life, how happiness is largely in our ability to manage if we can generate the right attitudes, etc. Discussed her hx with this field, her original goals and hopes/dreams, etc. Discussed how she might explore whether folks are happy in the field, what it takes to be happy in this sort of work.     Spent our session exploring how she can  "think through and explore these questions. Looked into the notions of \"content\" and \"process,\" techniques of CBT. Explored and applied these ideas to her current situation. Discussed challenges of career-choice, difficulties of dealing with and accepting ambiguities that are not resolvable (I.e. security of employment across a career). Discussed the notion of the \"quarter life crisis\" which resonates with her. Explored finding ways to accept the challenges of each stage of life, not catastrophizing how bad this is or can be - or under-estimating her ability to handle these challenges, etc. We agree to continue meeting to discuss these concerns and helping her develop her capacity to move towards her goals with confidence.     I affirmed the steps this patient has taken to address physical and behavioral health issues, and offered continued behavioral health services or referral, now or in the future, as needed by the patient.    Progress on Treatment Objective(s) / Homework:  Satisfactory progress - ACTION (Actively working towards change); Intervened by reinforcing change plan / affirming steps taken    Interventions drawn from:  Psycho-education regarding mental health diagnoses and treatment options    Motivational Interviewing    Skills training    Explored specific skills useful to client in current situation    Skill areas include assertiveness, communication, conflict management, problem-solving, relaxation, etc.     Solution-Focused Therapy    Explored patterns in patient's behaviors and relationships and discussed options for new behaviors    Explored new options for problem-solving, communication, managing stress, etc.    Cognitive-behavioral Therapy    Discussed common cognitive distortions, identified them in patient's life    Explored ways to challenge, replace, and act against these cognitions    Explore behavioral changes that might benefit patient in improving mood and engage in meaningful " activity    Acceptance and Commitment Therapy    Explored and identified important values in patient's life    Discussed ways to commit to behavioral activation around these values    Psychodynamic psychotherapy    Discussed patient's emotional dynamics and issues and how they impact behaviors    Explored patient's history of relationships and how they impact present behaviors    Explored how to work with and make changes in these schemas and patterns    Narrative Therapy    Explored the patient's story of their life from their perspective,     Explored alternate ways of understanding their experience, identifying exceptions, developing new themes    Existential Psychotherapy    Explored the patient's experience of an concerns about existential concerns: meaning, freedom, purpose in life, life and death, etc,     Explored systems of meaning, methods for exploring these questions, resources for discussion, etc.    Interpersonal Psychotherapy    Explored patterns in relationships that are effective or ineffective at helping patient reach their goals, find satisfying experience.    Discussed new patterns or behaviors to engage in for improved social functioning.    Behavioral Activation    Discussed steps patient can take to become more involved in meaningful activity    Identified barriers to these activities and explored possible solutions    Mindfulness-Based Strategies    Discussed skills based on development and application of mindfulness    Skills drawn from compassion-focused therapy, dialectical behavior therapy, mindfulness-based stress reduction, mindfulness-based cognitive therapy, etc.    Medication Review:  No problems reported to [facility] today.    Medication Compliance:  No problems reported to [facility] today.    Changes in Health Issues:  No problems reported to [facility] today.    Chemical Use Review:   Substance Use: Chemical use reviewed, no active concerns identified      Tobacco Use: No current tobacco use.       Mental Status Assessment:  Appearance:   Appropriate   Eye Contact:   Good   Psychomotor Behavior: Normal   Attitude:   Cooperative   Orientation:   All  Speech   Rate / Production: Normal    Volume:  Normal   Mood:    Anxious  Depressed - increased again lately, per her report  Affect:    Appropriate  Subdued   Thought Content:  Clear  Rumination   Thought Form:  Coherent  Logical   Insight:    Good     Safety Assessment:  Patient has had a history of suicidal ideation: some passive thoughts when she was younger - no plans, no intent, no attempts  Patient denies current or recent suicidal ideation or behaviors.  Patient denies current or recent homicidal ideation or behaviors.  Patient denies current or recent self injurious behavior or ideation.  Patient denies other safety concerns.  Patient reports there are no firearms in the house  Protective Factors Life Satisfaction, Reality testing ability, Positive coping skills, Positive problem-solving skills and Positive social support   Risk Factors Current high stress    Plan for Safety and Risk Management:  A safety and risk management plan has not been developed at this time, however patient was encouraged to call [place] [phone] should there be a change in any of these risk factors.    Diagnoses:  1. Major depressive disorder, recurrent episode, mild (H)    Consider anxiety disorders?    Collateral Reports Completed:  Will collaborate with care team as indicated during treatment    Plan: (Homework, other):  Patient was given information about behavioral services and encouraged to schedule a follow up appointment with the clinic [facility] as needed.  She was also given information about mental health symptoms and treatment options  and Cognitive Behavioral Therapy skills to practice when experiencing anxiety and depression.  CD Recommendations: No indications of CD issues. We agree to begin a course of psychotherapy to address anxiety, depression, stress.  Chucho  Haider, TLFT, [facility]    ______________________________________________________________________    [facility] Integrated Behavioral Health Treatment Plan    Client's Name: Paris Reddy  YOB: 1995    Date: 5/1/2020    DSM5 Diagnoses: (Sustained by DSM5 Criteria Listed Above)  Diagnoses: 296.22 (F32.1)  Major Depressive Disorder, Single Episode, Moderate With anxious distress  Psychosocial & Contextual Factors: social stressors, possible hx of childhood trauma  WHODAS Score: 20  See Media section of EPIC medical record for completed WHODAS  CGI-S: 4  CGI-I: 2    Referral / Collaboration:  Will collaborate with care team as indicated during treatment.    Anticipated number of session or this episode of care: 5-10      MeasurableTreatment Goal(s) related to diagnosis / functional impairment(s)  Goal 1:  Patient will experience a reduction in depressive and anxious symptoms, along with a corresponding increase in positive emotion and life satisfaction.    Objective #A: Patient will experience a reduction in depressed mood, will develop more effective coping skills to manage depressive symptoms, will develop healthy cognitive patterns and beliefs, will increase ability to function adaptively and will continue to take medications as prescribed / participate in supportive activities and services    Status: Continued - Date(s): 5/1/2020    Objective #B: Patient will experience a reduction in anxiety, will develop more effective coping skills to manage anxiety symptoms, will develop healthy cognitive patterns and beliefs and will increase ability to function adaptively  Status: Continued - Date(s): 5/1/2020    Objective #C: Patient will develop better understanding of triggers and coping strategies to stabilize mood  Status: Continued - Date(s): 5/1/2020    Goal 2:  Patient will identify and increase engagement in valued activity, i.e. improving social connections/relationships, pursuing occupational goals or  personally meaningful pursuits, exploration of meaning in life.     Objective #A: Patient will identify meaningful activity in social, occupational and  personal goals, and increase behavioral activation around these goals   Status: Continued - Date(s): 5/1/2020    Objective #B: Patient will address relationship difficulties in a more adaptive manner  Status: Continued - Date(s): 5/1/2020    Objective #C: Patient will develop coping/problem-solving skills to facilitate more adaptive adjustment and will effectively address problems that interfere with adaptive functioning  Status: Continued - Date(s): 5/1/2020    Possible Therapeutic Intervention(s)  Psycho-education regarding mental health diagnoses and treatment options    Eye-Movement Desensitization and Reprocessing Therapy    Clinical Hypnosis    Skills training    Explore skills useful to client in current situation.    Skills include assertiveness, communication, conflict management, problem-solving, relaxation, etc.    Solution-Focused Therapy    Explore patterns in patient's relationships and discuss options for new behaviors.    Explore patterns in patient's actions and choices and discuss options for new behaviors.    Cognitive-behavioral Therapy    Discuss common cognitive distortions, identify them in patient's life.    Explore ways to challenge, replace, and act against these cognitions.    Acceptance and Commitment Therapy    Explore and identify important values in patient's life.    Discuss ways to commit to behavioral activation around these values.    Psychodynamic psychotherapy    Discuss patient's emotional dynamics and issues and how they impact behaviors.    Explore patient's history of relationships and how they impact present behaviors.    Explore how to work with and make changes in these schemas and patterns.    Narrative Therapy    Explore the patient's story of his/her life from his/her perspective.    Explore alternate ways of  understanding their experience, identifying exceptions, developing new themes.    Interpersonal Psychotherapy    Explore patterns in relationships that are effective or ineffective at helping patient reach their goals, find satisfying experience.    Discuss new patterns or behaviors to engage in for improved social functioning.    Behavioral Activation    Discuss steps patient can take to become more involved in meaningful activity.    Identify barriers to these activities and explore possible solutions.    Mindfulness-Based Strategies    Discuss skills based on development and application of mindfulness.    Skills drawn from compassion-focused therapy, dialectical behavior therapy, mindfulness-based stress reduction, mindfulness-based cognitive therapy, etc.      We have developed these goals together during our work to this point. Patient has assisted in the development of these goals and has agreed to this treatment plan.       KATHERINE French, [facility]  5/1/2020

## 2020-08-18 ASSESSMENT — PATIENT HEALTH QUESTIONNAIRE - PHQ9: SUM OF ALL RESPONSES TO PHQ QUESTIONS 1-9: 5

## 2020-08-18 ASSESSMENT — ANXIETY QUESTIONNAIRES: GAD7 TOTAL SCORE: 3

## 2020-10-13 DIAGNOSIS — F50.819 BINGE EATING DISORDER: ICD-10-CM

## 2020-10-14 RX ORDER — LISDEXAMFETAMINE DIMESYLATE 30 MG/1
30 CAPSULE ORAL EVERY MORNING
Qty: 30 CAPSULE | Refills: 0 | Status: SHIPPED | OUTPATIENT
Start: 2020-11-12 | End: 2021-03-10

## 2020-10-14 RX ORDER — LISDEXAMFETAMINE DIMESYLATE 30 MG/1
30 CAPSULE ORAL DAILY
Qty: 30 CAPSULE | Refills: 0 | Status: SHIPPED | OUTPATIENT
Start: 2020-10-14 | End: 2021-03-10

## 2020-10-14 RX ORDER — LISDEXAMFETAMINE DIMESYLATE 30 MG/1
30 CAPSULE ORAL DAILY
Qty: 30 CAPSULE | Refills: 0 | Status: SHIPPED | OUTPATIENT
Start: 2020-12-12 | End: 2021-02-17

## 2020-12-14 ENCOUNTER — HEALTH MAINTENANCE LETTER (OUTPATIENT)
Age: 25
End: 2020-12-14

## 2020-12-17 DIAGNOSIS — F33.0 MILD EPISODE OF RECURRENT MAJOR DEPRESSIVE DISORDER (H): ICD-10-CM

## 2020-12-21 RX ORDER — SERTRALINE HYDROCHLORIDE 25 MG/1
25 TABLET, FILM COATED ORAL DAILY
Qty: 90 TABLET | Refills: 3 | OUTPATIENT
Start: 2020-12-21

## 2020-12-21 NOTE — TELEPHONE ENCOUNTER
Called mother and had to LM for a call back.   Did discuss original concern with PCP.   Will speak with mother and see how sxs are.   
Dup: last Rx: 5-4-20 for 90 tab w/3 RF- pharm called  
scratchy throat

## 2021-01-15 ENCOUNTER — HEALTH MAINTENANCE LETTER (OUTPATIENT)
Age: 26
End: 2021-01-15

## 2021-01-16 DIAGNOSIS — F33.0 MILD EPISODE OF RECURRENT MAJOR DEPRESSIVE DISORDER (H): ICD-10-CM

## 2021-01-19 RX ORDER — SERTRALINE HYDROCHLORIDE 25 MG/1
25 TABLET, FILM COATED ORAL DAILY
Qty: 90 TABLET | Refills: 0 | Status: SHIPPED | OUTPATIENT
Start: 2021-01-19 | End: 2021-03-10

## 2021-01-19 NOTE — TELEPHONE ENCOUNTER
sertraline (ZOLOFT) 25 MG tablet    Last Written Prescription Date:  5/4/20  Last Fill Quantity: 90,   # refills: 3  Last Office Visit : 6/12/20  Future Office visit:  None    90 day SENT to pharm    Routing refill request to provider for review/approval because: phq9

## 2021-02-17 DIAGNOSIS — F50.819 BINGE EATING DISORDER: ICD-10-CM

## 2021-02-17 RX ORDER — LISDEXAMFETAMINE DIMESYLATE 30 MG/1
30 CAPSULE ORAL DAILY
Qty: 30 CAPSULE | Refills: 0 | Status: SHIPPED | OUTPATIENT
Start: 2021-02-17 | End: 2021-03-10

## 2021-02-27 ENCOUNTER — HEALTH MAINTENANCE LETTER (OUTPATIENT)
Age: 26
End: 2021-02-27

## 2021-03-02 ENCOUNTER — MYC MEDICAL ADVICE (OUTPATIENT)
Dept: BEHAVIORAL HEALTH | Facility: CLINIC | Age: 26
End: 2021-03-02

## 2021-03-10 ENCOUNTER — OFFICE VISIT (OUTPATIENT)
Dept: INTERNAL MEDICINE | Facility: CLINIC | Age: 26
End: 2021-03-10
Payer: COMMERCIAL

## 2021-03-10 VITALS
SYSTOLIC BLOOD PRESSURE: 131 MMHG | OXYGEN SATURATION: 99 % | HEART RATE: 71 BPM | TEMPERATURE: 98 F | DIASTOLIC BLOOD PRESSURE: 84 MMHG | RESPIRATION RATE: 16 BRPM

## 2021-03-10 DIAGNOSIS — F33.0 MILD EPISODE OF RECURRENT MAJOR DEPRESSIVE DISORDER (H): ICD-10-CM

## 2021-03-10 DIAGNOSIS — F50.819 BINGE EATING DISORDER: ICD-10-CM

## 2021-03-10 PROCEDURE — 99213 OFFICE O/P EST LOW 20 MIN: CPT | Performed by: NURSE PRACTITIONER

## 2021-03-10 RX ORDER — SERTRALINE HYDROCHLORIDE 25 MG/1
25 TABLET, FILM COATED ORAL DAILY
Qty: 90 TABLET | Refills: 1 | Status: SHIPPED | OUTPATIENT
Start: 2021-03-10 | End: 2021-08-31

## 2021-03-10 RX ORDER — LISDEXAMFETAMINE DIMESYLATE 30 MG/1
30 CAPSULE ORAL EVERY MORNING
Qty: 30 CAPSULE | Refills: 0 | Status: SHIPPED | OUTPATIENT
Start: 2021-04-09 | End: 2021-08-31

## 2021-03-10 RX ORDER — LISDEXAMFETAMINE DIMESYLATE 30 MG/1
30 CAPSULE ORAL DAILY
Qty: 30 CAPSULE | Refills: 0 | Status: SHIPPED | OUTPATIENT
Start: 2021-05-09 | End: 2021-10-19 | Stop reason: DRUGHIGH

## 2021-03-10 RX ORDER — LISDEXAMFETAMINE DIMESYLATE 30 MG/1
30 CAPSULE ORAL DAILY
Qty: 30 CAPSULE | Refills: 0 | Status: SHIPPED | OUTPATIENT
Start: 2021-03-10 | End: 2021-08-31

## 2021-03-10 ASSESSMENT — ANXIETY QUESTIONNAIRES
6. BECOMING EASILY ANNOYED OR IRRITABLE: SEVERAL DAYS
1. FEELING NERVOUS, ANXIOUS, OR ON EDGE: NOT AT ALL
7. FEELING AFRAID AS IF SOMETHING AWFUL MIGHT HAPPEN: NOT AT ALL
2. NOT BEING ABLE TO STOP OR CONTROL WORRYING: SEVERAL DAYS
5. BEING SO RESTLESS THAT IT IS HARD TO SIT STILL: NOT AT ALL
3. WORRYING TOO MUCH ABOUT DIFFERENT THINGS: SEVERAL DAYS
IF YOU CHECKED OFF ANY PROBLEMS ON THIS QUESTIONNAIRE, HOW DIFFICULT HAVE THESE PROBLEMS MADE IT FOR YOU TO DO YOUR WORK, TAKE CARE OF THINGS AT HOME, OR GET ALONG WITH OTHER PEOPLE: SOMEWHAT DIFFICULT
GAD7 TOTAL SCORE: 3

## 2021-03-10 ASSESSMENT — PATIENT HEALTH QUESTIONNAIRE - PHQ9
5. POOR APPETITE OR OVEREATING: NOT AT ALL
SUM OF ALL RESPONSES TO PHQ QUESTIONS 1-9: 5

## 2021-03-10 ASSESSMENT — PAIN SCALES - GENERAL: PAINLEVEL: NO PAIN (0)

## 2021-03-10 NOTE — PROGRESS NOTES
"    Assessment & Plan     Binge eating disorder  Refill provided. Continue working on healthy lifestyle and stress management.   - lisdexamfetamine (VYVANSE) 30 MG capsule; Take 1 capsule (30 mg) by mouth daily  - lisdexamfetamine (VYVANSE) 30 MG capsule; Take 1 capsule (30 mg) by mouth every morning  - lisdexamfetamine (VYVANSE) 30 MG capsule; Take 1 capsule (30 mg) by mouth daily    Mild episode of recurrent major depressive disorder (H)  Mood stable with Sertraline, had more of \"numb\" feeling on higher doses, so will continue with 25 mg for now. Encouraged self-care with regular exercise, sufficient sleep, getting out in nature, and re-start therapy as scheduled with Chucho Maloney. She agrees with the plan.  - sertraline (ZOLOFT) 25 MG tablet; Take 1 tablet (25 mg) by mouth daily    23 minutes spent on the date of the encounter doing chart review, history and exam, documentation and further activities as noted above        Return in about 3 months (around 6/10/2021). to reassess mood and medication effectiveness.     SHAKIRA Pulido CNP  [facility] INTERNAL MEDICINE [facility]   Paris is a 25 year old who presents for the following health issues     HPI       Things going okay.  Had a month with pharmacy having issues with the insurance, and subsequently missed a month of Vyvanse.   Got rescheduled with Chucho Maloney. Things going well overall.   Got a call from [facility], told she can leave the IUD in place for 2 more years.   Occasional cramping during the time when she'd normally have her period. Occasional light spotting.   Rare headache, has been sent home from  with imitrex and ondansetron, not currently using.   Mood--\"doing okay\". Could be some improvement, but better than at some points this winter. Working on it by watching foods she's eating, trying to exercise daily with cardio on elliptical and small weight lifting. Trying to work on consistency rather " "than quantity. Water intake. Tries to get work out clothes ready so it's not another barrier.   Weight--went up a little when off the Vyvanse. Had overall been going on downward trend, but was more stressful when off. At higher doses feels like Sertraline makes her a \"numb\" version of herself, doesn't feel like herself as much, more \"blunted\" emotional response. Otherwise used Fluoxetine 20 mg in Feb 2019, didn't go well, had extreme emotional response and SI with that.      Review of Systems   Constitutional, HEENT, cardiovascular, pulmonary, gi and gu systems are negative, except as otherwise noted.    PHQ 12/4/2019 8/17/2020 3/10/2021   PHQ-9 Total Score 7 5 5   Q9: Thoughts of better off dead/self-harm past 2 weeks Not at all Not at all Not at all     ANGUS-7 SCORE 9/11/2019 8/17/2020 3/10/2021   Total Score 6 (mild anxiety) 3 (minimal anxiety) -   Total Score 6 3 3             Objective    /84   Pulse 71   Temp 98  F (36.7  C) (Oral)   Resp 16   SpO2 99%   There is no height or weight on file to calculate BMI.  Physical Exam   GENERAL: healthy, alert and no distress  RESP: lungs clear to auscultation - no rales, rhonchi or wheezes  CV: regular rate and rhythm, normal S1 S2, no S3 or S4, no murmur, click or rub, no peripheral edema and peripheral pulses strong  PSYCH: mentation appears normal, affect normal/bright                "

## 2021-03-10 NOTE — NURSING NOTE
Chief Complaint   Patient presents with     Recheck Medication     med check         Blaze Churchill MA on 3/10/2021 at 11:16 AM

## 2021-03-10 NOTE — PATIENT INSTRUCTIONS
[facility] Medication Refill Request Information:  * Please contact your pharmacy regarding ANY request for medication refills.  ** [facility] Prescription Fax = [phone]  * Please allow 3 business days for routine medication refills.  * Please allow 5 business days for controlled substance medication refills.     [facility] Test Result notification information:  *You will be notified with in 7-10 days of your appointment day regarding the results of your test.  If you are on MyChart you will be notified as soon as the provider has reviewed the results and signed off on them.    [facility]: [phone]

## 2021-03-11 ASSESSMENT — ANXIETY QUESTIONNAIRES: GAD7 TOTAL SCORE: 3

## 2021-03-26 NOTE — ED NOTES
MRN/Chart #:  [id number]    [facility]     AUTHORIZATION FOR DISCLOSURE OF HEALTH INFORMATION   FORMS COMPLETION     NURA Mikey Sewell     Previous Names:   Phone Number:  [phone]   YOB: 1976     AUTHORIZE RELEASE FROM:       [facility]       [facility]  [address] [place] [address]          DISCLOSE PROTECTED HEALTH INFORMATION TO:  Employer/Disability Insurance Carrier/Other [employer]  Street Address:    [facility] Zip:          ALL INFORMATION BELOW MUST BE COMPLETED BY PATIENT IN ORDER TO PROCESS FORM.   This Authorization may include the release of protected health information related to this forms request.     Doctor’s Name:________Fabiana Chaparro MD______________________________________    Type of Illness/Injury:_______Knee injury (left)_______________________________________     Date of Illness/Injury (if pregnancy-estimated due date): ______03/07/21______________________________     First Date Off Work (if applicable): _____03/07/2021_______________________________     Return to Work Date (if applicable):  _____TBD______________________________     Intermittent Leave (patient or caretaker):     YES: ____  Date Range: __8 hrs/day 2 times per week__________________________     NO: _____  Date Range:  ____________________________     PLEASE NOTE: Provider has fourteen (14) working days to complete the FMLA/Disability form.     Please indicate a delivery option below. All requests will be mailed to the patient unless otherwise indicated.   ? Pickup: Location: _______________________________________________________________  Call when ready phone #___________________________________________________________   ? Mail: (List address if other than above) ______________________________________________   ? Fax: Fax Number: _Fax to  Bed: ED25  Expected date: 6/17/18  Expected time: 1:00 PM  Means of arrival:   Comments:  Triage     JprsnzAzko__[phone]___________________________________________________________  Attention: ___LLUVIA____________________________________________________________________   PURPOSE FOR NEED OF DISCLOSURE: Forms Completion- [facility]’s policy is that the minimum necessary information to accomplish the purpose of the request will be disclosed.   YOUR RIGHTS WITH RESPECT TO THIS AUTHORIZATION:   I am aware that I have the right to inspect and receive a copy of the health information I have authorized to be used and/or disclosed by this Authorization. I understand that I may be charged a fee for record copies. In addition, I understand that I do not need to sign this Authorization in order to receive treatment. I also am aware that I may revoke this Authorization by notifying the Health Information department Corporate Office Building at @DBLINK(DEP,150,,,). However, I understand that my revocation will not be effective as to uses and/or disclosures: (1) already made in reliance upon this Authorization; or (2) needed for an insurer to contest a claim/policy as authorized by law if signing the Authorization was a condition to obtaining insurance coverage. I realize that the information used and/or disclosed pursuant to this Authorization may be subject to re-disclosure and no longer protected by federal privacy law.     Expiration Date: This authorization shall  on the following date or event that relates to the individual or the purpose of the use or disclosure:_______________________________________________.    If no date or event is specified, this authorization will  one (1) year from the date signed.     SIGNATURE OF PATIENT / LEGAL REP: _PT CONSENT TO VERBAL COMPLETION OF THIS FORM AND AUTHORIZES THAT FORM IS FAXED TO THE COMPANY LISTED ABOVE.____    DATE: ____________________     If signed by person other than patient, state relationship and authority to do so.   Patient  is:   ? Minor   ? Incompetent   ? Disabled   ?      Legal Authority (Optional):   ? Parent   ? Legal Guardian   ? Authorized Legal Representative   ? Power of    ? Power of  for Healthcare   ? Executor of Estate of    ? Spouse/Next of kin of  (Specify relationship) __________________________________     This release is executed in conformity with [place] Statues and Federal Privacy Regulations. FORM NAME: Authorization for Disclosure of Health Information -Forms Completion  CHT Number [facility]  Date Rev: 3/4/03,10/15/03; 06; , ; 1/10, 9/10, , , 10//14, 6/15, , 17      1

## 2021-04-08 ENCOUNTER — VIRTUAL VISIT (OUTPATIENT)
Dept: URGENT CARE | Facility: CLINIC | Age: 26
End: 2021-04-08
Payer: COMMERCIAL

## 2021-04-08 DIAGNOSIS — N30.00 ACUTE CYSTITIS WITHOUT HEMATURIA: Primary | ICD-10-CM

## 2021-04-08 PROCEDURE — 99213 OFFICE O/P EST LOW 20 MIN: CPT | Mod: 95

## 2021-04-08 RX ORDER — NITROFURANTOIN 25; 75 MG/1; MG/1
100 CAPSULE ORAL 2 TIMES DAILY
Qty: 10 CAPSULE | Refills: 0 | Status: SHIPPED | OUTPATIENT
Start: 2021-04-08 | End: 2021-04-13

## 2021-04-08 ASSESSMENT — ENCOUNTER SYMPTOMS
CARDIOVASCULAR NEGATIVE: 1
HEMATURIA: 0
DYSURIA: 1
NEUROLOGICAL NEGATIVE: 1
FREQUENCY: 1
DIFFICULTY URINATING: 1
RESPIRATORY NEGATIVE: 1
FLANK PAIN: 0
MUSCULOSKELETAL NEGATIVE: 1
CONSTITUTIONAL NEGATIVE: 1
EYES NEGATIVE: 1
GASTROINTESTINAL NEGATIVE: 1
PSYCHIATRIC NEGATIVE: 1

## 2021-04-09 NOTE — PROGRESS NOTES
Acute cystitis without hematuria  - nitroFURantoin macrocrystal-monohydrate (MACROBID) 100 MG capsule; Take 1 capsule (100 mg) by mouth 2 times daily for 5 days    10 minutes spent on the date of the encounter doing chart review, history and exam, documentation and further activities per the note     See Patient Instructions  Patient Instructions     Patient Education     Bladder Infection, Female (Adult)     Urine normally doesn't have any germs (bacteria) in it. But bacteria can get into the urinary tract from the skin around the rectum. Or they can travel in the blood from other parts of the body. Once they are in your urinary tract, they can cause infection in these areas:    The urethra (urethritis)    The bladder (cystitis)    The kidneys (pyelonephritis)  The most common place for an infection is in the bladder. This is called a bladder infection. This is one of the most common infections in women. Most bladder infections are easily treated. They are not serious unless the infection spreads to the kidney.  The terms bladder infection, UTI, and cystitis are often used to describe the same thing. But they are not always the same. Cystitis is an inflammation of the bladder. The most common cause of cystitis is an infection.  Symptoms  The infection causes inflammation in the urethra and bladder. This causes many of the symptoms. The most common symptoms of a bladder infection are:    Pain or burning when urinating    Having to urinate more often than normal    Urgent need to urinate    Only a small amount of urine comes out    Blood in urine    Belly (abdominal) discomfort. This is often in the lower belly above the pubic bone.    Cloudy urine    Strong- or bad-smelling urine    Unable to urinate (urinary retention)    Unable to hold urine in (urinary incontinence)    Fever    Loss of appetite    Confusion (in older adults)  Causes  Bladder infections are not contagious. You can't get one from someone else,  from a toilet seat, or from sharing a bath.  The most common cause of bladder infections is bacteria from the bowels. The bacteria get onto the skin around the opening of the urethra. From there, they can get into the urine. Then they travel up to the bladder, causing inflammation and infection. This often happens because of:    Wiping incorrectly after urinating. Always wipe from front to back.    Bowel incontinence    Pregnancy    Procedures such as having a catheter put in    Older age    Not emptying your bladder. This can give bacteria a chance to grow in your urine.    Fluid loss (dehydration)    Constipation    Having sex    Using a diaphragm for birth control   Treatment  Bladder infections are diagnosed by a urine test and urine culture. They are treated with antibiotics. They often clear up quickly without problems. Treatment helps prevent a more serious kidney infection.  Medicines  Medicines can help in the treatment of a bladder infection:    Take antibiotics until they are used up, even if you feel better. It's important to finish them to make sure the infection has cleared.    You can use acetaminophen or ibuprofen for pain, fever, or discomfort, unless another medicine was prescribed. If you have long-term (chronic) liver or kidney disease, talk with your healthcare provider before using these medicines. Also talk with your provider if you've ever had a stomach ulcer or GI (gastrointestinal) bleeding, or are taking blood-thinner medicines.    If you are given phenazopydridine to reduce burning with urination, it will make your urine a bright orange color. This can stain clothing.  Care and prevention  These self-care steps can help prevent future infections:    Drink plenty of fluids. This helps to prevent dehydration and flush out your bladder. Do this unless you must restrict fluids for other health reasons, or your healthcare provider told you not to.    Clean yourself correctly after going to the  bathroom. Wipe from front to back after using the toilet. This helps prevent the spread of bacteria.    Urinate more often. Don't try to hold urine in for a long time.    Wear loose-fitting clothes and cotton underwear. Don't wear tight-fitting pants.    Improve your diet and prevent constipation. Eat more fresh fruits and vegetables, and fiber. Eat less junk foods and fatty foods.    Don't have sex until your symptoms are gone.    Don't have caffeine, alcohol, and spicy foods. These can irritate your bladder.    Urinate right after you have sex to flush out your bladder.    If you use birth control pills and have frequent bladder infections, discuss it with your healthcare provider.  Follow-up care  Call your healthcare provider if all symptoms are not gone after 3 days of treatment. This is especially important if you have repeat infections.  If a culture was done, you will be told if your treatment needs to be changed. If directed, you can call to find out the results.  If X-rays were done, you will be told if the results will affect your treatment.  Call 911  Call 911 if any of the following occur:    Trouble breathing    Hard to wake up or confusion    Fainting (loss of consciousness)    Fast heart rate  When to get medical advice  Call your healthcare provider right away if any of these occur:    Fever of 100.4 F (38.0 C) or higher, or as directed by your healthcare provider    Symptoms are not better after 3 days of treatment    Back or belly pain that gets worse    Repeated vomiting, or unable to keep medicine down    Weakness or dizziness    Vaginal discharge    Pain, redness, or swelling in the outer vaginal area (labia)  [employer] last reviewed this educational content on 11/1/2019 2000-2020 [employer]. All rights reserved. This information is not intended as a substitute for professional medical care. Always follow your healthcare professional's instructions.               Jacques WOODWARD  [facility]    Subjective   25 year old year old who presents to clinic today for the following health issues: UTI symptoms        HPI     Genitourinary - Female  Onset/Duration: 1 day   Description:   Painful urination (Dysuria): YES           Frequency: YES  Blood in urine (Hematuria): no  Delay in urine (Hesitency): no  Intensity: moderate (6/10)  Progression of Symptoms:  worsening  Accompanying Signs & Symptoms:  Fever/chills: no  Flank pain: no  Nausea and vomiting: no  Vaginal symptoms: none  Abdominal/Pelvic Pain: no  History:   History of frequent UTI s: no  History of kidney stones: no  Sexually Active: no  Possibility of pregnancy: No  Precipitating or alleviating factors: None  Therapies tried and outcome: Pyridium , Cranberry juice prn (contraindicated in Coumadin patients), Increase fluid intake and OTC advil or tylenol       Review of Systems   Review of Systems   Constitutional: Negative.    HENT: Negative.    Eyes: Negative.    Respiratory: Negative.    Cardiovascular: Negative.    Gastrointestinal: Negative.    Genitourinary: Positive for decreased urine volume, difficulty urinating, dysuria, frequency, pelvic pain and urgency. Negative for enuresis, flank pain, genital sores, hematuria, menstrual problem, vaginal bleeding, vaginal discharge and vaginal pain.   Musculoskeletal: Negative.    Skin: Negative.    Neurological: Negative.    Psychiatric/Behavioral: Negative.         Objective    No PE or vital signs were taken    Physical Exam   Physical Exam

## 2021-04-09 NOTE — PATIENT INSTRUCTIONS

## 2021-04-30 ENCOUNTER — OFFICE VISIT (OUTPATIENT)
Dept: URGENT CARE | Facility: URGENT CARE | Age: 26
End: 2021-04-30
Payer: COMMERCIAL

## 2021-04-30 VITALS
WEIGHT: 220 LBS | HEIGHT: 64 IN | SYSTOLIC BLOOD PRESSURE: 124 MMHG | BODY MASS INDEX: 37.56 KG/M2 | TEMPERATURE: 98.8 F | DIASTOLIC BLOOD PRESSURE: 70 MMHG | HEART RATE: 80 BPM

## 2021-04-30 DIAGNOSIS — N89.8 VAGINAL DISCHARGE: ICD-10-CM

## 2021-04-30 DIAGNOSIS — R30.0 DYSURIA: Primary | ICD-10-CM

## 2021-04-30 LAB
ALBUMIN UR-MCNC: NEGATIVE MG/DL
APPEARANCE UR: CLEAR
BACTERIA #/AREA URNS HPF: ABNORMAL /HPF
BILIRUB UR QL STRIP: NEGATIVE
COLOR UR AUTO: YELLOW
GLUCOSE UR STRIP-MCNC: NEGATIVE MG/DL
HGB UR QL STRIP: NEGATIVE
KETONES UR STRIP-MCNC: NEGATIVE MG/DL
LEUKOCYTE ESTERASE UR QL STRIP: ABNORMAL
NITRATE UR QL: NEGATIVE
NON-SQ EPI CELLS #/AREA URNS LPF: ABNORMAL /LPF
PH UR STRIP: 5.5 PH (ref 5–7)
RBC #/AREA URNS AUTO: ABNORMAL /HPF
SOURCE: ABNORMAL
SP GR UR STRIP: 1.02 (ref 1–1.03)
SPECIMEN SOURCE: NORMAL
UROBILINOGEN UR STRIP-ACNC: 0.2 EU/DL (ref 0.2–1)
WBC #/AREA URNS AUTO: ABNORMAL /HPF
WET PREP SPEC: NORMAL

## 2021-04-30 PROCEDURE — 99213 OFFICE O/P EST LOW 20 MIN: CPT | Performed by: FAMILY MEDICINE

## 2021-04-30 PROCEDURE — 87210 SMEAR WET MOUNT SALINE/INK: CPT | Performed by: FAMILY MEDICINE

## 2021-04-30 PROCEDURE — 81001 URINALYSIS AUTO W/SCOPE: CPT | Performed by: PHYSICIAN ASSISTANT

## 2021-04-30 RX ORDER — SULFAMETHOXAZOLE/TRIMETHOPRIM 800-160 MG
1 TABLET ORAL 2 TIMES DAILY
Qty: 6 TABLET | Refills: 0 | Status: SHIPPED | OUTPATIENT
Start: 2021-04-30 | End: 2021-05-03

## 2021-04-30 ASSESSMENT — MIFFLIN-ST. JEOR: SCORE: 1722.91

## 2021-05-01 NOTE — PATIENT INSTRUCTIONS
Start the antibiotic tonight.   No alcohol at all until the treatment course is complete.    If your symptoms have not resolved completely by the time you finish the course, or if any new or worsening symptoms develop, return to care for further evaluation.

## 2021-05-01 NOTE — PROGRESS NOTES
"SUBJECTIVE:  Paris Reddy is a 25 year old female who  presents today for a possible UTI.   Symptoms: frequency, burning and voiding in small amounts.  C/o \"flank pain\" on both sides, drawing hands across upper buttocks and waistband of her jeans  Onset of symptoms: 2 days ago.  She had UTI symptoms about 3 weeks ago, and had a virtual visit and was given macrobid.  The symptoms improved, but didn't resolve completely.   She noticed symptoms really seemed to be coming back 2 days ago but not as bad as when she had her virtual visit.  Feels like \"it just isn't all the way gone\"  Discharge present?: yes  - noticed a thick white discharge this week, no itching.    Hematuria?: no.  LMP: No LMP recorded. (Menstrual status: IUD).  This patient does have a history of urinary tract infections.   Patient denies: fevers, flank pain or vomiting       OBJECTIVE:  /70   Pulse 80   Temp 98.8  F (37.1  C) (Oral)   Ht 1.626 m (5' 4\")   Wt 99.8 kg (220 lb)   Breastfeeding No   BMI 37.76 kg/m    GENERAL APPEARANCE: healthy, alert and no distress  RESP: lungs clear to auscultation - no rales, rhonchi or wheezes  CV: regular rates and rhythm, normal S1 S2, no murmur noted  BACK: No CVA tenderness      Labs:  Results for orders placed or performed in visit on 04/30/21 (from the past 24 hour(s))   *UA reflex to Microscopic and Culture ([place] and [facility] (except [place] and [place])    Specimen: Midstream Urine   Result Value Ref Range    Color Urine Yellow     Appearance Urine Clear     Glucose Urine Negative NEG^Negative mg/dL    Bilirubin Urine Negative NEG^Negative    Ketones Urine Negative NEG^Negative mg/dL    Specific Gravity Urine 1.020 1.003 - 1.035    Blood Urine Negative NEG^Negative    pH Urine 5.5 5.0 - 7.0 pH    Protein Albumin Urine Negative NEG^Negative mg/dL    Urobilinogen Urine 0.2 0.2 - 1.0 EU/dL    Nitrite Urine Negative NEG^Negative    Leukocyte Esterase Urine Trace (A) NEG^Negative    " Source Midstream Urine    Urine Microscopic   Result Value Ref Range    WBC Urine 0 - 5 OTO5^0 - 5 /HPF    RBC Urine O - 2 OTO2^O - 2 /HPF    Squamous Epithelial /LPF Urine Many (A) FEW^Few /LPF    Bacteria Urine Few (A) NEG^Negative /HPF   Wet prep    Specimen: Vagina   Result Value Ref Range    Specimen Description Vagina     Wet Prep Rare  WBC'S seen       Wet Prep No clue cells seen     Wet Prep No yeast seen     Wet Prep No Trichomonas seen        ASSESSMENT/PLAN:     ICD-10-CM    1. Dysuria  R30.0 *UA reflex to Microscopic and Culture ([place] and [facility] (except [place] and [place])     Urine Microscopic     sulfamethoxazole-trimethoprim (BACTRIM DS) 800-160 MG tablet   2. Vaginal discharge  N89.8 Wet prep       UA with trace LE only tonight and wet prep negative.   LBP rather than flank pain.  Will go ahead and cover for a UTI with her current symptoms.   We discussed the expected course of the illness and symptomatic cares in detail.       Patient Instructions   Start the antibiotic tonight.   No alcohol at all until the treatment course is complete.    If your symptoms have not resolved completely by the time you finish the course, or if any new or worsening symptoms develop, return to care for further evaluation.

## 2021-07-29 ENCOUNTER — VIRTUAL VISIT (OUTPATIENT)
Dept: BEHAVIORAL HEALTH | Facility: CLINIC | Age: 26
End: 2021-07-29
Payer: COMMERCIAL

## 2021-07-29 DIAGNOSIS — F33.0 MAJOR DEPRESSIVE DISORDER, RECURRENT EPISODE, MILD (H): Primary | ICD-10-CM

## 2021-07-29 PROCEDURE — 90832 PSYTX W PT 30 MINUTES: CPT | Mod: 95 | Performed by: MARRIAGE & FAMILY THERAPIST

## 2021-07-29 NOTE — PROGRESS NOTES
[facility] (PCC referral)  July 29, 2021  Video Visit      Behavioral Health Clinician Progress Note  Video Visit    Patient Name: Paris Reddy           Service Type:  Individual      Service Location:   Video Visit     Session Start Time: 310pm  Session End Time: 345pm      Session Length: 16 - 37      Attendees: Patient    Visit Activities (Refresh list every visit): [facility] Only    Diagnostic Assessment Date: 9/26/2019  Treatment Plan Review Date: 5/1/2020   CGI-S: 4   CGI-I 2    See Flowsheets for today's PHQ-9 and ANGUS-7 results  Previous PHQ-9:   PHQ-9 SCORE 12/4/2019 8/17/2020 3/10/2021   PHQ-9 Total Score MyChart - 5 (Mild depression) -   PHQ-9 Total Score 7 5 5     Previous ANGUS-7:   ANGUS-7 SCORE 9/11/2019 8/17/2020 3/10/2021   Total Score 6 (mild anxiety) 3 (minimal anxiety) -   Total Score 6 3 3       PITER LEVEL:  No flowsheet data found.    DATA  Extended Session (60+ minutes): No  Interactive Complexity: No  Crisis: No  [facility] Patient: No    Treatment Objective(s) Addressed in This Session:  Target Behavior(s): disease management/lifestyle changes      Patient  will experience a reduction in depressed mood, will develop more effective coping skills to manage depressive symptoms, will develop healthy cognitive patterns and beliefs and will increase ability to function adaptively, will experience a reduction in anxiety, will develop more effective coping skills to manage anxiety symptoms, will develop healthy cognitive patterns and beliefs and will increase ability to function adaptively and will develop coping/problem-solving skills to facilitate more adaptive adjustment    Current Stressors / Issues:  Telemedicine Visit: The patient's condition can be safely assessed and treated via synchronous audio and visual telemedicine encounter.      Reason for Telemedicine Visit: Covid-19 pandemic    Originating Site (Patient Location): Patient's home    Distant Site (Provider Location):  "Provider Remote Setting    Consent:  The patient/guardian has verbally consented to: the potential risks and benefits of telemedicine (video visit) versus in person care; bill my insurance or make self-payment for services provided; and responsibility for payment of non-covered services.     Mode of Communication:  Video Conference via [employer]    As the provider I attest to compliance with applicable laws and regulations related to telemedicine.      [facility] met with Paris to provide psychotherapy support regarding anxiety and depression, life stress. We attempted a video call today but the technology did not work, so we switched to a telephone call.    We spent our session catching up on how she has been since our last visit, current/new stressors, etc. She reports that her family is still \"very dramatic\" and there is lost of stress there. 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 She reports lots of rumination - after events especially she will think over situations and berate or criticize herself.    We reviewed ideas from CBT that we had previously been discussing - about how thoughts and perceptions lead to negative feelings, how we can be \"unrealistic and unreasonable\" in our thinking. Discussed research on social anxiety and the tendency to mis-interpret information when we are in stressful situations.     Discussed ACT at some length today. Discussed the complement that acceptance strategies offer to CBT work. Offered to send an article by Dario Pollock. We will meet to discuss these strategies in August, at her request.    I affirmed the steps this patient has taken to address physical and behavioral health issues, and offered continued behavioral health services or referral, now or in the future, as needed by the patient.    Progress on Treatment Objective(s) / Homework:  Satisfactory progress - ACTION (Actively " working towards change); Intervened by reinforcing change plan / affirming steps taken    Interventions drawn from:  Psycho-education regarding mental health diagnoses and treatment options    Motivational Interviewing    Solution-Focused Therapy    Explored patterns in patient's behaviors and relationships and discussed options for new behaviors    Explored new options for problem-solving, communication, managing stress, etc.    Cognitive-behavioral Therapy    Discussed common cognitive distortions, identified them in patient's life    Explored ways to challenge, replace, and act against these cognitions    Explore behavioral changes that might benefit patient in improving mood and engage in meaningful activity    Acceptance and Commitment Therapy    Explored and identified important values in patient's life    Discussed ways to commit to behavioral activation around these values    Behavioral Activation    Discussed steps patient can take to become more involved in meaningful activity    Identified barriers to these activities and explored possible solutions    Medication Review:  No problems reported to [facility] today.    Medication Compliance:  No problems reported to [facility] today.    Changes in Health Issues:  No problems reported to [facility] today.    Chemical Use Review:   Substance Use: Chemical use reviewed, no active concerns identified      Tobacco Use: No current tobacco use.      Mental Status Assessment:  Appearance:   Appropriate   Eye Contact:   Good   Psychomotor Behavior: Normal   Attitude:   Cooperative   Orientation:   All  Speech   Rate / Production: Normal    Volume:  Normal   Mood:    Anxious  Depressed - fluctuates, contextual in nature  Affect:    Appropriate  Subdued   Thought Content:  Clear  Rumination   Thought Form:  Coherent  Logical   Insight:    Good     Safety Assessment:  Patient has had a history of suicidal ideation: some passive thoughts when she was younger - no plans, no intent, no  attempts  Patient denies current or recent suicidal ideation or behaviors.  Patient denies current or recent homicidal ideation or behaviors.  Patient denies current or recent self injurious behavior or ideation.  Patient denies other safety concerns.  Patient reports there are no firearms in the house  Protective Factors Life Satisfaction, Reality testing ability, Positive coping skills, Positive problem-solving skills and Positive social support   Risk Factors Current high stress    Plan for Safety and Risk Management:  A safety and risk management plan has not been developed at this time, however patient was encouraged to call [place] [phone] should there be a change in any of these risk factors.    Diagnoses:  1. Major depressive disorder, recurrent episode, mild (H)    Consider anxiety disorders?    Collateral Reports Completed:  Will collaborate with care team as indicated during treatment    Plan: (Homework, other):  Patient was given information about behavioral services and encouraged to schedule a follow up appointment with the clinic [facility] as needed.  She was also given information about mental health symptoms and treatment options  and Cognitive Behavioral Therapy skills to practice when experiencing anxiety and depression.  CD Recommendations: No indications of CD issues. We agree to continue this course of psychotherapy to address anxiety, depression, stress.  KATHERINE Neal, [facility]    ______________________________________________________________________    [facility] Integrated Behavioral Health Treatment Plan    Client's Name: Paris Reddy  YOB: 1995    Date: 5/1/2020    DSM5 Diagnoses: (Sustained by DSM5 Criteria Listed Above)  Diagnoses: 296.22 (F32.1)  Major Depressive Disorder, Single Episode, Moderate With anxious distress  Psychosocial & Contextual Factors: social stressors, possible hx of childhood trauma  WHODAS Score: 20  See Media section of EPIC medical record for  completed WHODAS  CGI-S: 4  CGI-I: 2    Referral / Collaboration:  Will collaborate with care team as indicated during treatment.    Anticipated number of session or this episode of care: 5-10      MeasurableTreatment Goal(s) related to diagnosis / functional impairment(s)  Goal 1:  Patient will experience a reduction in depressive and anxious symptoms, along with a corresponding increase in positive emotion and life satisfaction.    Objective #A: Patient will experience a reduction in depressed mood, will develop more effective coping skills to manage depressive symptoms, will develop healthy cognitive patterns and beliefs, will increase ability to function adaptively and will continue to take medications as prescribed / participate in supportive activities and services    Status: Continued - Date(s): 5/1/2020    Objective #B: Patient will experience a reduction in anxiety, will develop more effective coping skills to manage anxiety symptoms, will develop healthy cognitive patterns and beliefs and will increase ability to function adaptively  Status: Continued - Date(s): 5/1/2020    Objective #C: Patient will develop better understanding of triggers and coping strategies to stabilize mood  Status: Continued - Date(s): 5/1/2020    Goal 2:  Patient will identify and increase engagement in valued activity, i.e. improving social connections/relationships, pursuing occupational goals or personally meaningful pursuits, exploration of meaning in life.     Objective #A: Patient will identify meaningful activity in social, occupational and  personal goals, and increase behavioral activation around these goals   Status: Continued - Date(s): 5/1/2020    Objective #B: Patient will address relationship difficulties in a more adaptive manner  Status: Continued - Date(s): 5/1/2020    Objective #C: Patient will develop coping/problem-solving skills to facilitate more adaptive adjustment and will effectively address problems that  interfere with adaptive functioning  Status: Continued - Date(s): 5/1/2020    Possible Therapeutic Intervention(s)  Psycho-education regarding mental health diagnoses and treatment options    Eye-Movement Desensitization and Reprocessing Therapy    Clinical Hypnosis    Skills training    Explore skills useful to client in current situation.    Skills include assertiveness, communication, conflict management, problem-solving, relaxation, etc.    Solution-Focused Therapy    Explore patterns in patient's relationships and discuss options for new behaviors.    Explore patterns in patient's actions and choices and discuss options for new behaviors.    Cognitive-behavioral Therapy    Discuss common cognitive distortions, identify them in patient's life.    Explore ways to challenge, replace, and act against these cognitions.    Acceptance and Commitment Therapy    Explore and identify important values in patient's life.    Discuss ways to commit to behavioral activation around these values.    Psychodynamic psychotherapy    Discuss patient's emotional dynamics and issues and how they impact behaviors.    Explore patient's history of relationships and how they impact present behaviors.    Explore how to work with and make changes in these schemas and patterns.    Narrative Therapy    Explore the patient's story of his/her life from his/her perspective.    Explore alternate ways of understanding their experience, identifying exceptions, developing new themes.    Interpersonal Psychotherapy    Explore patterns in relationships that are effective or ineffective at helping patient reach their goals, find satisfying experience.    Discuss new patterns or behaviors to engage in for improved social functioning.    Behavioral Activation    Discuss steps patient can take to become more involved in meaningful activity.    Identify barriers to these activities and explore possible solutions.    Mindfulness-Based Strategies    Discuss  skills based on development and application of mindfulness.    Skills drawn from compassion-focused therapy, dialectical behavior therapy, mindfulness-based stress reduction, mindfulness-based cognitive therapy, etc.      We have developed these goals together during our work to this point. Patient has assisted in the development of these goals and has agreed to this treatment plan.       KATHERINE French, [facility]  5/1/2020

## 2021-08-31 ENCOUNTER — VIRTUAL VISIT (OUTPATIENT)
Dept: INTERNAL MEDICINE | Facility: CLINIC | Age: 26
End: 2021-08-31
Payer: COMMERCIAL

## 2021-08-31 DIAGNOSIS — F33.0 MILD EPISODE OF RECURRENT MAJOR DEPRESSIVE DISORDER (H): ICD-10-CM

## 2021-08-31 DIAGNOSIS — F41.9 ANXIETY: ICD-10-CM

## 2021-08-31 DIAGNOSIS — F50.819 BINGE EATING DISORDER: Primary | ICD-10-CM

## 2021-08-31 PROCEDURE — 99214 OFFICE O/P EST MOD 30 MIN: CPT | Mod: 95 | Performed by: NURSE PRACTITIONER

## 2021-08-31 RX ORDER — LISDEXAMFETAMINE DIMESYLATE 50 MG/1
50 CAPSULE ORAL DAILY
Qty: 30 CAPSULE | Refills: 0 | Status: SHIPPED | OUTPATIENT
Start: 2021-11-01 | End: 2022-03-15

## 2021-08-31 RX ORDER — LISDEXAMFETAMINE DIMESYLATE 50 MG/1
50 CAPSULE ORAL DAILY
Qty: 30 CAPSULE | Refills: 0 | Status: SHIPPED | OUTPATIENT
Start: 2021-10-01 | End: 2021-10-31

## 2021-08-31 RX ORDER — LISDEXAMFETAMINE DIMESYLATE 50 MG/1
50 CAPSULE ORAL DAILY
Qty: 30 CAPSULE | Refills: 0 | Status: SHIPPED | OUTPATIENT
Start: 2021-08-31 | End: 2021-09-30

## 2021-08-31 ASSESSMENT — ANXIETY QUESTIONNAIRES
2. NOT BEING ABLE TO STOP OR CONTROL WORRYING: SEVERAL DAYS
5. BEING SO RESTLESS THAT IT IS HARD TO SIT STILL: NOT AT ALL
GAD7 TOTAL SCORE: 3
7. FEELING AFRAID AS IF SOMETHING AWFUL MIGHT HAPPEN: NOT AT ALL
IF YOU CHECKED OFF ANY PROBLEMS ON THIS QUESTIONNAIRE, HOW DIFFICULT HAVE THESE PROBLEMS MADE IT FOR YOU TO DO YOUR WORK, TAKE CARE OF THINGS AT HOME, OR GET ALONG WITH OTHER PEOPLE: SOMEWHAT DIFFICULT
1. FEELING NERVOUS, ANXIOUS, OR ON EDGE: NOT AT ALL
3. WORRYING TOO MUCH ABOUT DIFFERENT THINGS: SEVERAL DAYS
6. BECOMING EASILY ANNOYED OR IRRITABLE: SEVERAL DAYS

## 2021-08-31 ASSESSMENT — PATIENT HEALTH QUESTIONNAIRE - PHQ9
SUM OF ALL RESPONSES TO PHQ QUESTIONS 1-9: 10
5. POOR APPETITE OR OVEREATING: NOT AT ALL

## 2021-08-31 NOTE — PROGRESS NOTES
"Paris is a 25 year old who is being evaluated via a billable video visit.      How would you like to obtain your AVS? MyChart  If the video visit is dropped, the invitation should be resent by: Text to cell phone: [phone]   Will anyone else be joining your video visit? No      Video Start Time: 3:57 PM    Assessment & Plan     Binge eating disorder  Insufficient control, continues with binging behaviors, exacerbated by anxiety. Continue working with therapy and having healthy food options at home, but will increase Vyvanse to 50 mg daily. Check BP occasionally at work in calm setting, to review at next visit.   - lisdexamfetamine (VYVANSE) 50 MG capsule; Take 1 capsule (50 mg) by mouth daily  - lisdexamfetamine (VYVANSE) 50 MG capsule; Take 1 capsule (50 mg) by mouth daily  - lisdexamfetamine (VYVANSE) 50 MG capsule; Take 1 capsule (50 mg) by mouth daily    Mild episode of recurrent major depressive disorder (H)  Anxiety  Scheduled for follow-up with Chucho Maloney later this week; she is interested in establishing a more long-term therapist. Encouraged her to check with insurance re: preferred providers, consider bridging with employee assistance program if needed. Previously taking Sertraline ( mg), felt this did not make a significant improvement in mood and flat affect, and SI with Fluoxetine, could consider alternative selective serotonin reuptake inhibitor or SNRI, re-assess in 1 month, but particularly given hx seasonal depression, would not want to delay treatment going into winter months.         31 minutes spent on the date of the encounter doing chart review, history and exam, documentation and further activities per the note       Return in about 4 weeks (around 9/28/2021).    SHAKIRA Pulido CNP  [facility] INTER[facility] MEDIC[facility] [place]    Subjective   Paris is a 25 year old who presents for the following health issues     HPI       Mood--\"on and off\".  Stressful with " "insufficient staff at work, trying to feel grateful to have a job, but has been working a lot of overtime, which she recognizes is not sustainable.  Recently met with Chucho Maloney again, working on different approach to help with anxiety, which seems to be beneficial. Looking for more long-term support, but having more difficulty with scheduling. Next appointment on Friday.   Sertraline--no longer taking, didn't notice a tangible difference with it, even on a higher dose, felt \"really flat\" and just wanted to keep to herself, didn't want to interact with others. Was on 100 mg sertraline per care everywhere. Was struggling in school at the time, felt former therapist through [facility] was condescending/not helpful. Has been embarrassed about asking re: dosing increase with Vyvanse.     Binge eating--on and off. Took a break from the vyvanse to see if any difference, does notice that it helps while taking, but notes it is \"not super well controlled.\" Trying to organize foods and what is available at home, otherwise feels that treating anxiety through therapy can be helpful. Has difficulty depending on what time she takes the Vyvanse--if takes too early in the morning, wears off by the end of the day and has more binging in the evenings.   BP at work 120-127/81-83.   Using SAD light for seasonal affective disorder, but concerned mood could worsen going into winter months.      Review of Systems   Constitutional, HEENT, cardiovascular, pulmonary, gi and gu systems are negative, except as otherwise noted.      Objective           Vitals:  No vitals were obtained today due to virtual visit.    Physical Exam   GENERAL: Healthy, alert and no distress  EYES: Eyes grossly normal to inspection.  No discharge or erythema, or obvious scleral/conjunctival abnormalities.  RESP: No audible wheeze, cough, or visible cyanosis.  No visible retractions or increased work of breathing.    SKIN: Visible skin clear. No significant rash, " abnormal pigmentation or lesions.  NEURO: Cranial nerves grossly intact.  Mentation and speech appropriate for age.  PSYCH: Mentation appears normal, affect normal/bright, judgement and insight intact, normal speech and appearance well-groomed.                Video-Visit Details    Type of service:  Video Visit    Video End Time:4:26 PM    Originating Location (pt. Location): Home    Distant Location (provider location):  [facility] INTER[facility] MEDIC[facility] [place]     Platform used for Video Visit: [employer]

## 2021-08-31 NOTE — PATIENT INSTRUCTIONS
[facility] Medication Refill Request Information:  * Please contact your pharmacy regarding ANY request for medication refills.  ** [facility] Prescription Fax = [phone]  * Please allow 3 business days for routine medication refills.  * Please allow 5 business days for controlled substance medication refills.     [facility] Test Result notification information:  *You will be notified with in 7-10 days of your appointment day regarding the results of your test.  If you are on MyChart you will be notified as soon as the provider has reviewed the results and signed off on them.    [facility]: [phone]

## 2021-09-01 ASSESSMENT — ANXIETY QUESTIONNAIRES: GAD7 TOTAL SCORE: 3

## 2021-09-03 ENCOUNTER — VIRTUAL VISIT (OUTPATIENT)
Dept: BEHAVIORAL HEALTH | Facility: CLINIC | Age: 26
End: 2021-09-03
Payer: COMMERCIAL

## 2021-09-03 DIAGNOSIS — F33.0 MAJOR DEPRESSIVE DISORDER, RECURRENT EPISODE, MILD (H): Primary | ICD-10-CM

## 2021-09-03 PROCEDURE — 90834 PSYTX W PT 45 MINUTES: CPT | Mod: 95 | Performed by: MARRIAGE & FAMILY THERAPIST

## 2021-09-03 NOTE — PROGRESS NOTES
[facility] (PCC referral)  September 3, 2021  Video Visit      Behavioral Health Clinician Progress Note  Video Visit    Patient Name: Paris Reddy           Service Type:  Individual      Service Location:   Video Visit     Session Start Time: 3pm  Session End Time: 340pm      Session Length: 38 - 52      Attendees: Patient    Visit Activities (Refresh list every visit): [facility] Only    Diagnostic Assessment Date: 9/26/2019  Treatment Plan Review Date: 5/1/2020   CGI-S: 4   CGI-I 2    See Flowsheets for today's PHQ-9 and ANGUS-7 results  Previous PHQ-9:   PHQ-9 SCORE 8/17/2020 3/10/2021 8/31/2021   PHQ-9 Total Score MyChart 5 (Mild depression) - -   PHQ-9 Total Score 5 5 10     Previous ANGUS-7:   ANGUS-7 SCORE 8/17/2020 3/10/2021 8/31/2021   Total Score 3 (minimal anxiety) - -   Total Score 3 3 3       PITER LEVEL:  No flowsheet data found.    DATA  Extended Session (60+ minutes): No  Interactive Complexity: No  Crisis: No  [facility] Patient: No    Treatment Objective(s) Addressed in This Session:  Target Behavior(s): disease management/lifestyle changes      Patient  will experience a reduction in depressed mood, will develop more effective coping skills to manage depressive symptoms, will develop healthy cognitive patterns and beliefs and will increase ability to function adaptively, will experience a reduction in anxiety, will develop more effective coping skills to manage anxiety symptoms, will develop healthy cognitive patterns and beliefs and will increase ability to function adaptively and will develop coping/problem-solving skills to facilitate more adaptive adjustment    Current Stressors / Issues:  Telemedicine Visit: The patient's condition can be safely assessed and treated via synchronous audio and visual telemedicine encounter.      Reason for Telemedicine Visit: Covid-19 pandemic    Originating Site (Patient Location): Patient's home    Distant Site (Provider Location): Provider Remote  Setting    Consent:  The patient/guardian has verbally consented to: the potential risks and benefits of telemedicine (video visit) versus in person care; bill my insurance or make self-payment for services provided; and responsibility for payment of non-covered services.     Mode of Communication:  Video Conference via [employer]    As the provider I attest to compliance with applicable laws and regulations related to telemedicine.      [facility] met with Paris to provide psychotherapy support regarding anxiety and depression, life stress. We attempted a video call today but the technology did not work, so we switched to a telephone call.      Missy continues to have a generally okay but not great. She is at about a 5 out of 10, she says. We had a discussion about her experience of therapy and explored what she might be looking for or need. She did like the article we looked at last time and has found a lot of helpful insights to apply in her life. She says she is looking for more guided questions, more meaningful conversations that help her express what she is dealing with and then incorporation. Discussed a possible referral to find another therapist to try.    Spent some time discussing ACT in more depth, exploring how it might support her in her goals. Offered some workbook and online resources to explore. We scheduled a follow-up and I will reach out to a colleague to see if I can assist Deana in transferring to another therapist.    I affirmed the steps this patient has taken to address physical and behavioral health issues, and offered continued behavioral health services or referral, now or in the future, as needed by the patient.    Progress on Treatment Objective(s) / Homework:  Satisfactory progress - ACTION (Actively working towards change); Intervened by reinforcing change plan / affirming steps taken    Interventions drawn from:  Psycho-education regarding mental health diagnoses and treatment  options    Motivational Interviewing    Solution-Focused Therapy    Explored patterns in patient's behaviors and relationships and discussed options for new behaviors    Explored new options for problem-solving, communication, managing stress, etc.    Cognitive-behavioral Therapy    Discussed common cognitive distortions, identified them in patient's life    Explored ways to challenge, replace, and act against these cognitions    Explore behavioral changes that might benefit patient in improving mood and engage in meaningful activity    Acceptance and Commitment Therapy    Explored and identified important values in patient's life    Discussed ways to commit to behavioral activation around these values    Behavioral Activation    Discussed steps patient can take to become more involved in meaningful activity    Identified barriers to these activities and explored possible solutions    Medication Review:  No problems reported to [facility] today.    Medication Compliance:  No problems reported to [facility] today.    Changes in Health Issues:  No problems reported to [facility] today.    Chemical Use Review:   Substance Use: Chemical use reviewed, no active concerns identified      Tobacco Use: No current tobacco use.      Mental Status Assessment:  Appearance:   Appropriate   Eye Contact:   Good   Psychomotor Behavior: Normal   Attitude:   Cooperative   Orientation:   All  Speech   Rate / Production: Normal    Volume:  Normal   Mood:    Anxious  Depressed - fluctuates, contextual in nature  Affect:    Appropriate  Subdued   Thought Content:  Clear  Rumination   Thought Form:  Coherent  Logical   Insight:    Good     Safety Assessment:  Patient has had a history of suicidal ideation: some passive thoughts when she was younger - no plans, no intent, no attempts  Patient denies current or recent suicidal ideation or behaviors.  Patient denies current or recent homicidal ideation or behaviors.  Patient denies current or recent self  injurious behavior or ideation.  Patient denies other safety concerns.  Patient reports there are no firearms in the house  Protective Factors Life Satisfaction, Reality testing ability, Positive coping skills, Positive problem-solving skills and Positive social support   Risk Factors Current high stress    Plan for Safety and Risk Management:  A safety and risk management plan has not been developed at this time, however patient was encouraged to call [place] [phone] should there be a change in any of these risk factors.    Diagnoses:  1. Major depressive disorder, recurrent episode, mild (H)        Collateral Reports Completed:  Will collaborate with care team as indicated during treatment    Plan: (Homework, other):  Patient was given information about behavioral services and encouraged to schedule a follow up appointment with the clinic [facility] as needed.  She was also given information about mental health symptoms and treatment options  and Cognitive Behavioral Therapy skills to practice when experiencing anxiety and depression.  CD Recommendations: No indications of CD issues. We agree to continue this course of psychotherapy to address anxiety, depression, stress.  KATHERINE Neal, [facility]    ______________________________________________________________________    [facility] Integrated Behavioral Health Treatment Plan    Client's Name: Paris Reddy  YOB: 1995    Date: 5/1/2020    DSM5 Diagnoses: (Sustained by DSM5 Criteria Listed Above)  Diagnoses: 296.22 (F32.1)  Major Depressive Disorder, Single Episode, Moderate With anxious distress  Psychosocial & Contextual Factors: social stressors, possible hx of childhood trauma  WHODAS Score: 20  See Media section of EPIC medical record for completed WHODAS  CGI-S: 4  CGI-I: 2    Referral / Collaboration:  Will collaborate with care team as indicated during treatment.    Anticipated number of session or this episode of care:  5-10      MeasurableTreatment Goal(s) related to diagnosis / functional impairment(s)  Goal 1:  Patient will experience a reduction in depressive and anxious symptoms, along with a corresponding increase in positive emotion and life satisfaction.    Objective #A: Patient will experience a reduction in depressed mood, will develop more effective coping skills to manage depressive symptoms, will develop healthy cognitive patterns and beliefs, will increase ability to function adaptively and will continue to take medications as prescribed / participate in supportive activities and services    Status: Continued - Date(s): 5/1/2020    Objective #B: Patient will experience a reduction in anxiety, will develop more effective coping skills to manage anxiety symptoms, will develop healthy cognitive patterns and beliefs and will increase ability to function adaptively  Status: Continued - Date(s): 5/1/2020    Objective #C: Patient will develop better understanding of triggers and coping strategies to stabilize mood  Status: Continued - Date(s): 5/1/2020    Goal 2:  Patient will identify and increase engagement in valued activity, i.e. improving social connections/relationships, pursuing occupational goals or personally meaningful pursuits, exploration of meaning in life.     Objective #A: Patient will identify meaningful activity in social, occupational and  personal goals, and increase behavioral activation around these goals   Status: Continued - Date(s): 5/1/2020    Objective #B: Patient will address relationship difficulties in a more adaptive manner  Status: Continued - Date(s): 5/1/2020    Objective #C: Patient will develop coping/problem-solving skills to facilitate more adaptive adjustment and will effectively address problems that interfere with adaptive functioning  Status: Continued - Date(s): 5/1/2020    Possible Therapeutic Intervention(s)  Psycho-education regarding mental health diagnoses and treatment  options    Eye-Movement Desensitization and Reprocessing Therapy    Clinical Hypnosis    Skills training    Explore skills useful to client in current situation.    Skills include assertiveness, communication, conflict management, problem-solving, relaxation, etc.    Solution-Focused Therapy    Explore patterns in patient's relationships and discuss options for new behaviors.    Explore patterns in patient's actions and choices and discuss options for new behaviors.    Cognitive-behavioral Therapy    Discuss common cognitive distortions, identify them in patient's life.    Explore ways to challenge, replace, and act against these cognitions.    Acceptance and Commitment Therapy    Explore and identify important values in patient's life.    Discuss ways to commit to behavioral activation around these values.    Psychodynamic psychotherapy    Discuss patient's emotional dynamics and issues and how they impact behaviors.    Explore patient's history of relationships and how they impact present behaviors.    Explore how to work with and make changes in these schemas and patterns.    Narrative Therapy    Explore the patient's story of his/her life from his/her perspective.    Explore alternate ways of understanding their experience, identifying exceptions, developing new themes.    Interpersonal Psychotherapy    Explore patterns in relationships that are effective or ineffective at helping patient reach their goals, find satisfying experience.    Discuss new patterns or behaviors to engage in for improved social functioning.    Behavioral Activation    Discuss steps patient can take to become more involved in meaningful activity.    Identify barriers to these activities and explore possible solutions.    Mindfulness-Based Strategies    Discuss skills based on development and application of mindfulness.    Skills drawn from compassion-focused therapy, dialectical behavior therapy, mindfulness-based stress reduction,  mindfulness-based cognitive therapy, etc.      We have developed these goals together during our work to this point. Patient has assisted in the development of these goals and has agreed to this treatment plan.       KATHERINE French, [facility]  5/1/2020

## 2021-09-28 ENCOUNTER — VIRTUAL VISIT (OUTPATIENT)
Dept: PSYCHOLOGY | Facility: CLINIC | Age: 26
End: 2021-09-28
Payer: COMMERCIAL

## 2021-09-28 DIAGNOSIS — F33.0 MILD EPISODE OF RECURRENT MAJOR DEPRESSIVE DISORDER (H): Primary | ICD-10-CM

## 2021-09-28 PROCEDURE — 90834 PSYTX W PT 45 MINUTES: CPT | Mod: 95 | Performed by: SOCIAL WORKER

## 2021-09-28 ASSESSMENT — PATIENT HEALTH QUESTIONNAIRE - PHQ9
SUM OF ALL RESPONSES TO PHQ QUESTIONS 1-9: 11
10. IF YOU CHECKED OFF ANY PROBLEMS, HOW DIFFICULT HAVE THESE PROBLEMS MADE IT FOR YOU TO DO YOUR WORK, TAKE CARE OF THINGS AT HOME, OR GET ALONG WITH OTHER PEOPLE: SOMEWHAT DIFFICULT
SUM OF ALL RESPONSES TO PHQ QUESTIONS 1-9: 11

## 2021-09-28 ASSESSMENT — ANXIETY QUESTIONNAIRES
1. FEELING NERVOUS, ANXIOUS, OR ON EDGE: SEVERAL DAYS
8. IF YOU CHECKED OFF ANY PROBLEMS, HOW DIFFICULT HAVE THESE MADE IT FOR YOU TO DO YOUR WORK, TAKE CARE OF THINGS AT HOME, OR GET ALONG WITH OTHER PEOPLE?: SOMEWHAT DIFFICULT
7. FEELING AFRAID AS IF SOMETHING AWFUL MIGHT HAPPEN: NOT AT ALL
6. BECOMING EASILY ANNOYED OR IRRITABLE: SEVERAL DAYS
GAD7 TOTAL SCORE: 4
7. FEELING AFRAID AS IF SOMETHING AWFUL MIGHT HAPPEN: NOT AT ALL
GAD7 TOTAL SCORE: 4
GAD7 TOTAL SCORE: 4
4. TROUBLE RELAXING: NOT AT ALL
2. NOT BEING ABLE TO STOP OR CONTROL WORRYING: SEVERAL DAYS
5. BEING SO RESTLESS THAT IT IS HARD TO SIT STILL: NOT AT ALL
3. WORRYING TOO MUCH ABOUT DIFFERENT THINGS: SEVERAL DAYS
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
## 2021-09-29 ASSESSMENT — ANXIETY QUESTIONNAIRES: GAD7 TOTAL SCORE: 4

## 2021-09-29 ASSESSMENT — PATIENT HEALTH QUESTIONNAIRE - PHQ9: SUM OF ALL RESPONSES TO PHQ QUESTIONS 1-9: 11

## 2021-10-02 ENCOUNTER — HEALTH MAINTENANCE LETTER (OUTPATIENT)
Age: 26
End: 2021-10-02

## 2021-10-05 ENCOUNTER — VIRTUAL VISIT (OUTPATIENT)
Dept: INTERNAL MEDICINE | Facility: CLINIC | Age: 26
End: 2021-10-05
Payer: COMMERCIAL

## 2021-10-05 DIAGNOSIS — F41.9 ANXIETY: ICD-10-CM

## 2021-10-05 DIAGNOSIS — F33.8 SEASONAL DEPRESSION (H): ICD-10-CM

## 2021-10-05 DIAGNOSIS — F33.0 MILD EPISODE OF RECURRENT MAJOR DEPRESSIVE DISORDER (H): Primary | ICD-10-CM

## 2021-10-05 PROCEDURE — 99214 OFFICE O/P EST MOD 30 MIN: CPT | Mod: 95 | Performed by: NURSE PRACTITIONER

## 2021-10-05 RX ORDER — DULOXETIN HYDROCHLORIDE 20 MG/1
CAPSULE, DELAYED RELEASE ORAL
Qty: 67 CAPSULE | Refills: 0 | Status: SHIPPED | OUTPATIENT
Start: 2021-10-05 | End: 2022-03-15

## 2021-10-05 NOTE — PATIENT INSTRUCTIONS
[facility] Medication Refill Request Information:  * Please contact your pharmacy regarding ANY request for medication refills.  ** [facility] Prescription Fax = [phone]  * Please allow 3 business days for routine medication refills.  * Please allow 5 business days for controlled substance medication refills.     [facility] Test Result notification information:  *You will be notified with in 7-10 days of your appointment day regarding the results of your test.  If you are on MyChart you will be notified as soon as the provider has reviewed the results and signed off on them.    [facility]: [phone]

## 2021-10-05 NOTE — PROGRESS NOTES
Paris is a 25 year old who is being evaluated via a billable video visit.      How would you like to obtain your AVS? MyChart  If the video visit is dropped, the invitation should be resent by: Send to e-mail at: [email or web address]  Will anyone else be joining your video visit? No      Video Start Time: 9:31 AM    Assessment & Plan     Mild episode of recurrent major depressive disorder (H)  Anxiety  Seasonal depression (H)  Will start Duloxetine 20 mg and if tolerating well, increase to 20 mg BID. Reviewed potential side effects, or monitoring for s/s serotonin syndrome while taking Vyvanse. Continue working on good self care with getting good sleep, regular exercise, stress management, getting outside, light box therapy, and continue meeting with psychology. She agrees with the plan.  - DULoxetine (CYMBALTA) 20 MG capsule; Take 1 capsule (20 mg) by mouth daily for 7 days, THEN 1 capsule (20 mg) 2 times daily.    Return in about 3 weeks (around 10/26/2021).    SHAKIRA Pulido [facility] INTERNAL MEDICINE [facility]   Paris is a 25 year old who presents for the following health issues     HPI     Med follow-up  Feels things going okay with Vyvanse, BP has remained stable. Feels like it's made a difference, may be soon to tell. No new side effects.   Started seeing a new psychologist for therapy, doesn't offer type of model that has previously been helpful, will give a couple more sessions a try to see how things go.  PHQ 3/10/2021 8/31/2021 9/28/2021   PHQ-9 Total Score 5 10 11   Q9: Thoughts of better off dead/self-harm past 2 weeks Not at all Not at all Not at all     ANGUS-7 SCORE 3/10/2021 8/31/2021 9/28/2021   Total Score - - 4 (minimal anxiety)   Total Score 3 3 4       Feels mood a little worse, impacted by getting darker earlier. Has been using SAD light for a couple days.     Review of Systems   Constitutional, HEENT, cardiovascular, pulmonary, gi  and gu systems are negative, except as otherwise noted.      Objective           Vitals:  No vitals were obtained today due to virtual visit.    Physical Exam   GENERAL: Healthy, alert and no distress  EYES: Eyes grossly normal to inspection.  No discharge or erythema, or obvious scleral/conjunctival abnormalities.  RESP: No audible wheeze, cough, or visible cyanosis.  No visible retractions or increased work of breathing.    SKIN: Visible skin clear. No significant rash, abnormal pigmentation or lesions.  NEURO: Cranial nerves grossly intact.  Mentation and speech appropriate for age.  PSYCH: Mentation appears normal, affect normal/bright, judgement and insight intact, normal speech and appearance well-groomed.                Video-Visit Details    Type of service:  Video Visit    Video End Time:9:46 AM    Originating Location (pt. Location): Work    Distant Location (provider location):  [facility] INTERNAL MEDICINE [place]     Platform used for Video Visit: [employer]   no

## 2021-10-05 NOTE — NURSING NOTE
Chief Complaint   Patient presents with     Recheck Medication     med check        ELYSSA Gross at 8:49 AM on 10/5/2021.

## 2021-10-06 ENCOUNTER — HOSPITAL ENCOUNTER (EMERGENCY)
Facility: CLINIC | Age: 26
Discharge: HOME | End: 2021-10-07
Attending: EMERGENCY MEDICINE | Admitting: EMERGENCY MEDICINE
Payer: COMMERCIAL

## 2021-10-06 ENCOUNTER — HOSPITAL ENCOUNTER (EMERGENCY)
Facility: CLINIC | Age: 26
Discharge: LEFT BEFORE DISCHARGE | End: 2021-10-06
Payer: COMMERCIAL

## 2021-10-06 VITALS
HEART RATE: 95 BPM | BODY MASS INDEX: 40.29 KG/M2 | OXYGEN SATURATION: 100 % | TEMPERATURE: 98.7 F | SYSTOLIC BLOOD PRESSURE: 138 MMHG | HEIGHT: 64 IN | DIASTOLIC BLOOD PRESSURE: 88 MMHG | RESPIRATION RATE: 18 BRPM | WEIGHT: 236 LBS

## 2021-10-06 VITALS
HEART RATE: 83 BPM | RESPIRATION RATE: 16 BRPM | OXYGEN SATURATION: 99 % | SYSTOLIC BLOOD PRESSURE: 129 MMHG | DIASTOLIC BLOOD PRESSURE: 86 MMHG | TEMPERATURE: 97 F

## 2021-10-06 DIAGNOSIS — R55 PRE-SYNCOPE: ICD-10-CM

## 2021-10-06 DIAGNOSIS — R00.2 PALPITATIONS: ICD-10-CM

## 2021-10-06 LAB
ANION GAP SERPL CALCULATED.3IONS-SCNC: 5 MMOL/L (ref 3–14)
BASOPHILS # BLD AUTO: 0 10E3/UL (ref 0–0.2)
BASOPHILS NFR BLD AUTO: 0 %
BUN SERPL-MCNC: 10 MG/DL (ref 7–30)
CALCIUM SERPL-MCNC: 8.8 MG/DL (ref 8.5–10.1)
CHLORIDE BLD-SCNC: 107 MMOL/L (ref 94–109)
CO2 SERPL-SCNC: 28 MMOL/L (ref 20–32)
CREAT SERPL-MCNC: 0.9 MG/DL (ref 0.52–1.04)
EOSINOPHIL # BLD AUTO: 0.2 10E3/UL (ref 0–0.7)
EOSINOPHIL NFR BLD AUTO: 2 %
ERYTHROCYTE [DISTWIDTH] IN BLOOD BY AUTOMATED COUNT: 12.7 % (ref 10–15)
GFR SERPL CREATININE-BSD FRML MDRD: 89 ML/MIN/1.73M2
GLUCOSE BLD-MCNC: 98 MG/DL (ref 70–99)
HCT VFR BLD AUTO: 42.5 % (ref 35–47)
HGB BLD-MCNC: 14 G/DL (ref 11.7–15.7)
IMM GRANULOCYTES # BLD: 0 10E3/UL
IMM GRANULOCYTES NFR BLD: 0 %
LYMPHOCYTES # BLD AUTO: 2.5 10E3/UL (ref 0.8–5.3)
LYMPHOCYTES NFR BLD AUTO: 33 %
MCH RBC QN AUTO: 30.6 PG (ref 26.5–33)
MCHC RBC AUTO-ENTMCNC: 32.9 G/DL (ref 31.5–36.5)
MCV RBC AUTO: 93 FL (ref 78–100)
MONOCYTES # BLD AUTO: 0.4 10E3/UL (ref 0–1.3)
MONOCYTES NFR BLD AUTO: 5 %
NEUTROPHILS # BLD AUTO: 4.3 10E3/UL (ref 1.6–8.3)
NEUTROPHILS NFR BLD AUTO: 60 %
NRBC # BLD AUTO: 0 10E3/UL
NRBC BLD AUTO-RTO: 0 /100
PLATELET # BLD AUTO: 332 10E3/UL (ref 150–450)
POTASSIUM BLD-SCNC: 3.7 MMOL/L (ref 3.4–5.3)
RBC # BLD AUTO: 4.57 10E6/UL (ref 3.8–5.2)
SODIUM SERPL-SCNC: 140 MMOL/L (ref 133–144)
TROPONIN I SERPL-MCNC: <0.015 UG/L (ref 0–0.04)
WBC # BLD AUTO: 7.4 10E3/UL (ref 4–11)

## 2021-10-06 PROCEDURE — 85379 FIBRIN DEGRADATION QUANT: CPT | Performed by: EMERGENCY MEDICINE

## 2021-10-06 PROCEDURE — 84484 ASSAY OF TROPONIN QUANT: CPT | Performed by: EMERGENCY MEDICINE

## 2021-10-06 PROCEDURE — 84443 ASSAY THYROID STIM HORMONE: CPT | Performed by: EMERGENCY MEDICINE

## 2021-10-06 PROCEDURE — 84703 CHORIONIC GONADOTROPIN ASSAY: CPT | Performed by: EMERGENCY MEDICINE

## 2021-10-06 PROCEDURE — 258N000003 HC RX IP 258 OP 636: Performed by: EMERGENCY MEDICINE

## 2021-10-06 PROCEDURE — 96361 HYDRATE IV INFUSION ADD-ON: CPT

## 2021-10-06 PROCEDURE — 96374 THER/PROPH/DIAG INJ IV PUSH: CPT

## 2021-10-06 PROCEDURE — 80048 BASIC METABOLIC PNL TOTAL CA: CPT | Performed by: EMERGENCY MEDICINE

## 2021-10-06 PROCEDURE — 93005 ELECTROCARDIOGRAM TRACING: CPT

## 2021-10-06 PROCEDURE — 85025 COMPLETE CBC W/AUTO DIFF WBC: CPT | Performed by: EMERGENCY MEDICINE

## 2021-10-06 PROCEDURE — 36415 COLL VENOUS BLD VENIPUNCTURE: CPT | Performed by: EMERGENCY MEDICINE

## 2021-10-06 PROCEDURE — 99284 EMERGENCY DEPT VISIT MOD MDM: CPT | Mod: 25

## 2021-10-06 PROCEDURE — 250N000013 HC RX MED GY IP 250 OP 250 PS 637: Performed by: EMERGENCY MEDICINE

## 2021-10-06 PROCEDURE — 250N000011 HC RX IP 250 OP 636: Performed by: EMERGENCY MEDICINE

## 2021-10-06 RX ORDER — MECLIZINE HYDROCHLORIDE 25 MG/1
25 TABLET ORAL ONCE
Status: COMPLETED | OUTPATIENT
Start: 2021-10-06 | End: 2021-10-06

## 2021-10-06 RX ORDER — ONDANSETRON 2 MG/ML
4 INJECTION INTRAMUSCULAR; INTRAVENOUS EVERY 30 MIN PRN
Status: DISCONTINUED | OUTPATIENT
Start: 2021-10-06 | End: 2021-10-07 | Stop reason: HOSPADM

## 2021-10-06 RX ADMIN — SODIUM CHLORIDE 1000 ML: 9 INJECTION, SOLUTION INTRAVENOUS at 23:59

## 2021-10-06 RX ADMIN — ONDANSETRON HYDROCHLORIDE 4 MG: 2 INJECTION, SOLUTION INTRAMUSCULAR; INTRAVENOUS at 23:59

## 2021-10-06 RX ADMIN — MECLIZINE HYDROCHLORIDE 25 MG: 25 TABLET ORAL at 23:59

## 2021-10-06 ASSESSMENT — ENCOUNTER SYMPTOMS
NECK PAIN: 0
APPETITE CHANGE: 0
SHORTNESS OF BREATH: 0
PALPITATIONS: 1
COUGH: 0
FEVER: 0
VOMITING: 0
CHEST TIGHTNESS: 1
WEAKNESS: 0
DYSURIA: 0
CHILLS: 0
ABDOMINAL PAIN: 0
HEADACHES: 1
BACK PAIN: 0
DIFFICULTY URINATING: 0
NAUSEA: 1
LIGHT-HEADEDNESS: 1

## 2021-10-06 ASSESSMENT — MIFFLIN-ST. JEOR: SCORE: 1795.49

## 2021-10-07 LAB
ATRIAL RATE - MUSE: 80 BPM
D DIMER PPP FEU-MCNC: 0.36 UG/ML FEU (ref 0–0.5)
DIASTOLIC BLOOD PRESSURE - MUSE: NORMAL MMHG
HCG SERPL QL: NEGATIVE
HOLD SPECIMEN: NORMAL
INTERPRETATION ECG - MUSE: NORMAL
P AXIS - MUSE: 62 DEGREES
PR INTERVAL - MUSE: 162 MS
QRS DURATION - MUSE: 78 MS
QT - MUSE: 390 MS
QTC - MUSE: 449 MS
R AXIS - MUSE: 67 DEGREES
SYSTOLIC BLOOD PRESSURE - MUSE: NORMAL MMHG
T AXIS - MUSE: 55 DEGREES
TSH SERPL DL<=0.005 MIU/L-ACNC: 1.9 MU/L (ref 0.4–4)
VENTRICULAR RATE- MUSE: 80 BPM

## 2021-10-07 RX ORDER — MECLIZINE HCL 12.5 MG 12.5 MG/1
25 TABLET ORAL 4 TIMES DAILY PRN
Qty: 30 TABLET | Refills: 0 | Status: SHIPPED | OUTPATIENT
Start: 2021-10-07 | End: 2021-10-29

## 2021-10-07 RX ORDER — ONDANSETRON 4 MG/1
4 TABLET, ORALLY DISINTEGRATING ORAL EVERY 8 HOURS PRN
Qty: 10 TABLET | Refills: 0 | Status: SHIPPED | OUTPATIENT
Start: 2021-10-07 | End: 2021-10-10

## 2021-10-07 NOTE — ED PROVIDER NOTES
History   Chief Complaint:  Palpitations       The history is provided by the patient.      Paris Reddy is a 25 year old female with history of migraines who presents for evaluation of palpitations, chest tightness, and light-headedness. Paris developed chest tightness and palpitations yesterday evening which have been persistent throughout today. She reports associated nausea and waves of light-headedness when leaning her head back with a mild headache. Light-headedness is worse when sitting but not necessarily when standing. No neck pain. No vomiting, abdominal pain, or appetite change. No cough, fever, chills, shortness of breath, back pain, or urinary issues. No leg pain or swelling. No extremity weakness. There is a low chance or pregnancy as she has an IUD. She also notes that she would describe herself as easily motion sick, but has not been moving around a lot in the past few days.     Review of Systems   Constitutional: Negative for appetite change, chills and fever.   Respiratory: Positive for chest tightness. Negative for cough and shortness of breath.    Cardiovascular: Positive for palpitations. Negative for leg swelling.   Gastrointestinal: Positive for nausea. Negative for abdominal pain and vomiting.   Genitourinary: Negative for difficulty urinating and dysuria.   Musculoskeletal: Negative for back pain and neck pain.   Neurological: Positive for light-headedness and headaches. Negative for weakness.   All other systems reviewed and are negative.    Allergies:  No Known Allergies    Medications:  Duloxetine    Levonorgestrel IUD  Lisdexamfetamine     Past Medical History:     Depression  Migraines  Obesity  Vitamin D deficiency    Past Surgical History:    Cholecystectomy    Family History:    Cervical cancer, HPV+  Migraines    Social History:  Presents with her partner  Tobacco use: negative  Alcohol use: Positive  Drug use: Positive for marijuana     Physical Exam     Patient Vitals  "for the past 24 hrs:   BP Temp Temp src Pulse Resp SpO2 Height Weight   10/06/21 2300 138/88 98.7  F (37.1  C) Oral 95 18 100 % 1.626 m (5' 4\") 107 kg (236 lb)     Orthostatics:  Lying BP: 108/70 Lying Pulse: 71  Sitting BP: 119/85 Sitting Pulse: 66  Standing BP: 119/88 Standing Pulse: 77     Physical Exam   General: Patient is awake, alert and interactive when I enter the room  Head: The scalp, face, and head appear normal  Eyes: The pupils are equal, round, and reactive to light. Conjunctivae and sclerae are normal. No notable nystagmus. Extraocular movements are intact.  ENT: External acoustic canals are normal. The oropharynx is normal without erythema. Uvula is in the midline. Bilateral TMs normal.  Neck: Normal range of motion.   CV: Regular rate and rhythm.   Resp: Lungs are clear without wheezes or rales. No respiratory distress.   GI: Abdomen is soft, no rigidity, guarding, or rebound. No distension. No tenderness to palpation in any quadrant.     MS: Normal tone. Joints grossly normal without effusions. No asymmetric leg swelling, calf or thigh tenderness.    Skin: No rash or lesions noted. Normal capillary refill noted  Neuro: CN's II-XII without obvious abnormality.    5/5 UE and LE strength which is symmetrical.   Reflexes are 2+ and symmetrical. Negative Pronator drift.    Finger to Nose testing is intact bilaterally.    Light touch is symmetrical bilateral UE's and LE's.  PERRLA, EOMI.    No meningismus and full neck AROM/PROM.    Psych:  Normal affect.  Appropriate interactions.    Emergency Department Course   ECG  ECG obtained at 2306, ECG read at 2347  Normal sinus rhythm. Normal ECG.    No prior ECG for comparison.   Rate 80 bpm. NJ interval 162 ms. QRS duration 78 ms. QT/QTc 390/449 ms. P-R-T axes 62 67 55.     Laboratory:  CBC: WBC 7.4, HGB 14.0,    BMP: all WNL (Creatinine 0.90)     Troponin (Collected 2311): <0.015  Ddimer: 0.36    TSH: 1.90    HCG Qualitative Pregnancy: Negative  "     Emergency Department Course:  Reviewed:  I reviewed nursing notes, vitals, past medical history and Care Everywhere    Assessments:  2340 I obtained history and examined the patient as noted above.   0050 I rechecked the patient and explained findings.     Interventions:  2359 0.9% sodium chloride bolus, 1,000 ml, IV  2359 Zofran, 4 mg, IV  2359 Meclizine, 25 mg, PO    Disposition:  The patient was discharged to home.     Impression & Plan     Medical Decision Making:  Paris is a very kind 25-year-old female with possible history of migraines presents to the emergency department today with palpitations, chest tightness and lightheadedness. When she describes her light headedness this appears to be most consistent with presyncopal symptoms. On initial evaluation she is hemodynamically stable with normal vital signs. She is afebrile. She is oxygenating well on room air. Clinically she looks quite well. On physical exam I am unable to detect any evidence of significant neurological deficit. Unlikely to represent a central neurologic pathology at this time. No occasion for advanced imaging. Work-up in the emergency department is largely unremarkable.  A broad differential diagnosis was considered including SVT, Atrial fibrillation, ventricular arrhythmia, thyroid disease, acute electrolyte abnormality,  drugs/medications, caffeine intake or other stimulants, medication side effect, anemia, heart disease, PE, etc. EKG doesn't show any acute signs of dysrhythmia or ischemia. Troponin is negative making ACS much less likely. PE was also considered given her symptomatology. Given her work-up and negative D-dimer I believe that this is unlikely to represent PE at this time. No evidence of thyroid pathology. She is not pregnant. No evidence of electrolyte abnormalities. However given her complaint of some increased dizziness with movement of her head and neck I wondered about the possibility of increased carotid sinus  sensitivity. However I was unable to elicit any symptoms with carotid sinus massage. Tilt table testing could be indicated in the future. Patient was treated symptomatically with some improvement of her symptoms. At this point I have not found any evidence of significant sinister pathology necessitating further work-up and admission to the hospital. Therefore we will discharge the patient home with close follow-up with her primary care physician. Symptomatic medications will be prescribed. I also will set up the patient with a Zio patch and she can follow-up with these results with her primary care physician.    Diagnosis:    ICD-10-CM    1. Palpitations  R00.2 Leadless EKG Monitor 3 to 7 Days   2. Pre-syncope  R55 Leadless EKG Monitor 3 to 7 Days       Discharge Medications:  Discharge Medication List as of 10/7/2021 12:43 AM           Scribe Disclosure:  Katherine SCOTT, am serving as a scribe at 11:47 PM on 10/6/2021 to document services personally performed by Tristan Tolliver MD based on my observations and the provider's statements to me.            Tristan Tolliver MD  10/07/21 0120

## 2021-10-07 NOTE — ED TRIAGE NOTES
Here for concern of palpitations, chest tightness, and lightheadedness started last night. ABCs intact.

## 2021-10-12 ENCOUNTER — VIRTUAL VISIT (OUTPATIENT)
Dept: PSYCHOLOGY | Facility: CLINIC | Age: 26
End: 2021-10-12
Payer: COMMERCIAL

## 2021-10-12 DIAGNOSIS — F33.0 MILD EPISODE OF RECURRENT MAJOR DEPRESSIVE DISORDER (H): Primary | ICD-10-CM

## 2021-10-12 PROCEDURE — 90834 PSYTX W PT 45 MINUTES: CPT | Mod: 95 | Performed by: SOCIAL WORKER

## 2021-10-12 NOTE — PROGRESS NOTES
"                   Progress Note - Initial Visit    Client Name:  Paris Reddy Date: 10/12/21         Service Type: Individual     Visit Start Time: 3:00 pm  Visit End Time: 3:50 pm    Visit #: 2    Attendees: Client    Service Modality:  Video Visit:      Provider verified identity through the following two step process.  Patient provided:  Patient photo    Telemedicine Visit: The patient's condition can be safely assessed and treated via synchronous audio and visual telemedicine encounter.      Reason for Telemedicine Visit: Patient has requested telehealth visit, Patient convenience (e.g. access to timely appointments / distance to available provider) and Services only offered telehealth    Originating Site (Patient Location): Patient's home    Distant Site (Provider Location): Provider Remote Setting- Home Office    Consent:  The patient/guardian has verbally consented to: the potential risks and benefits of telemedicine (video visit) versus in person care; bill my insurance or make self-payment for services provided; and responsibility for payment of non-covered services.     Patient would like the video invitation sent by:  My Chart    Mode of Communication:  Video Conference via [employer]well    As the provider I attest to compliance with applicable laws and regulations related to telemedicine.       DATA:   Interactive Complexity: No   Crisis: No      Presenting Concerns/  Current Stressors:  Pt was in the hospital recently stating she came back from [place] not feeling well, nausea and headaches. She is wondering about [place], hoping to work at home some days, has been working remotely since beginning of pandemic.\"What's bothering me the most besides my health is the change in seasons.\" She did talk with her PCP and was prescribed a new med; she didn't start it, wants to talk to her PCP first. Pt states she stopped taking Vyyvanse abruptly last week after not feeling well, pt denied this is contributing to " not feeling well now. Regardless, advices she notiify her PCP when she stopped vyyvanse. Discussed grounding techniques - freeze and hold citrus, focus on sensation, progressive relaxation - pt will practice this.      ASSESSMENT:  Mental Status Assessment:  Appearance:   Appropriate   Eye Contact:   Good   Psychomotor Behavior: Normal   Attitude:   Guarded   Orientation:   All  Speech   Rate / Production: Normal/ Responsive   Volume:  Normal   Mood:    Anxious   Affect:    Appropriate   Thought Content:  Clear   Thought Form:  Coherent   Insight:    Fair       Safety Issues and Plan for Safety and Risk Management:     [place] Suicide Severity Rating Scale (Lifetime/Recent)  [place] Suicide Severity Rating (Lifetime/Recent) 9/26/2019   1. Wish to be Dead (Lifetime) Yes   Wish to be Dead Description (Lifetime) two years ago going through a tough period of depression - passive thought with no plan or intent   1. Wish to be Dead (Recent) No   2. Non-Specific Active Suicidal Thoughts (Lifetime) Yes   Non-Specific Active Suicidal Thought Description (Lifetime) two years ago going through a tough period of depression - passive thought with no plan or intent   2. Non-Specific Active Suicidal Thoughts (Recent) No   3. Active Suicidal Ideation with any Methods (Not Plan) Without Intent to Act (Lifetime) No   3. Active Suicidal Ideation with any Methods (Not Plan) Without Intent to Act (Recent) No   4. Active Suicidal Ideation with Some Intent to Act, Without Specific Plan (Lifetime) No   4. Active Suicidal Ideation with Some Intent to Act, Without Specific Plan (Recent) No   5. Active Suicidal Ideation with Specific Plan and Intent (Lifetime) No   5. Active Suicidal Ideation with Specific Plan and Intent (Recent) No   Most Severe Ideation Rating (Lifetime) 5   Most Severe Ideation Description (Lifetime) two years ago going through a tough period of depression - passive thought with no plan or intent   Frequency (Lifetime)  NA   Duration (Lifetime) 5   Controllability (Lifetime) 3   Protective Factors  (Lifetime) 2   Reasons for Ideation (Lifetime) 0   Most Severe Ideation Rating (Past Month) NA   Most Severe Ideation Description (Past Month) n/a   Frequency (Past Month) NA   Controllability (Past Month) NA   Protective Factors (Past Month) NA   Reasons for Ideation (Past Month) NA   Actual Attempt (Lifetime) No   Actual Attempt (Past 3 Months) No   Has subject engaged in non-suicidal self-injurious behavior? (Lifetime) No   Has subject engaged in non-suicidal self-injurious behavior? (Past 3 Months) No   Interrupted Attempts (Lifetime) No   Interrupted Attempts (Past 3 Months) No   Aborted or Self-Interrupted Attempt (Lifetime) No   Aborted or Self-Interrupted Attempt (Past 3 Months) No   Preparatory Acts or Behavior (Lifetime) No   Preparatory Acts or Behavior (Past 3 Months) No   Most Recent Attempt Actual Lethality Code NA   Most Lethal Attempt Actual Lethality Code NA   Initial/First Attempt Actual Lethality Code NA     Patient denies current fears or concerns for personal safety.  Patient denies current or recent suicidal ideation or behaviors.  Patient denies current or recent homicidal ideation or behaviors.  Patient denies current or recent self injurious behavior or ideation.  Patient denies other safety concerns.  Recommended that patient call 911 or go to the local ED should there be a change in any of these risk factors.  Patient reports there are no firearms in the house.     Diagnostic Criteria:  CRITERIA (A-C) REPRESENT A MAJOR DEPRESSIVE EPISODE - SELECT THESE CRITERIA  A) Recurrent episode(s) - symptoms have been present during the same 2-week period and represent a change from previous functioning 5 or more symptoms (required for diagnosis)   - Depressed mood. Note: In children and adolescents, can be irritable mood.     - Diminished interest or pleasure in all, or almost all, activities.    - Fatigue or loss of  energy.    - Feelings of worthlessness or excessive guilt.    - Diminished ability to think or concentrate, or indecisiveness.   B) The symptoms cause clinically significant distress or impairment in social, occupational, or other important areas of functioning  C) The episode is not attributable to the physiological effects of a substance or to another medical condition  D) The occurence of major depressive episode is not better explained by other thought / psychotic disorders  E) There has never been a manic episode or hypomanic episode      DSM5 Diagnoses: (Sustained by DSM5 Criteria Listed Above)  Diagnoses: 296.21 (F32.0) Major Depressive Disorder, Single Episode, Mild _ and With mixed features  Psychosocial & Contextual Factors: Patient lives at home with her partner who is highly supportive. She is employed FT and cites no financial concerns. Cultural identity is important to her and is part her identity.     WHODAS 2.0 (12 item):   WHODAS 2.0 Total Score 9/11/2019 9/28/2021   Total Score 20 21   Total Score MyChart 20 21     PHQ 3/10/2021 8/31/2021 9/28/2021   PHQ-9 Total Score 5 10 11   Q9: Thoughts of better off dead/self-harm past 2 weeks Not at all Not at all Not at all       Intervention:   Assertive- Reviewed passive/aggressive/assertive communication styles and Psychodynamic- Patient processed internal experiences   Collateral Reports Completed:  Not Applicable      PLAN: (Homework, other):  1. Diagnostic assessment completed 9/26/2019 by KATHERINE Neal - due 9/26/2022.  Patient was given the following to do until next session: follow up with PCP re: medications and seasonal mood patterns, mindfulness practice around self-worth, trust, and fact-checking.    2. Provider recommended the following referrals: follow up with PCP re: seasonal patterns of depressive symptoms, rec for phototherapy, review medications    3.  Safety plan created.  Provider recommended that patient call crisis response, 911,  or present to the ED if changes in mood occur.     KIMBERLY Perez, LICSW  September 28, 2021

## 2021-10-19 ENCOUNTER — VIRTUAL VISIT (OUTPATIENT)
Dept: INTERNAL MEDICINE | Facility: CLINIC | Age: 26
End: 2021-10-19
Payer: COMMERCIAL

## 2021-10-19 DIAGNOSIS — R51.9 HEADACHE ON TOP OF HEAD: ICD-10-CM

## 2021-10-19 DIAGNOSIS — R42 DIZZINESS: Primary | ICD-10-CM

## 2021-10-19 DIAGNOSIS — B00.9 HSV (HERPES SIMPLEX VIRUS) INFECTION: ICD-10-CM

## 2021-10-19 DIAGNOSIS — B09 VIRAL RASH: ICD-10-CM

## 2021-10-19 DIAGNOSIS — R11.0 NAUSEA: ICD-10-CM

## 2021-10-19 PROCEDURE — 99214 OFFICE O/P EST MOD 30 MIN: CPT | Mod: 95 | Performed by: NURSE PRACTITIONER

## 2021-10-19 RX ORDER — VALACYCLOVIR HYDROCHLORIDE 1 G/1
1000 TABLET, FILM COATED ORAL DAILY
Qty: 5 TABLET | Refills: 0 | Status: SHIPPED | OUTPATIENT
Start: 2021-10-19

## 2021-10-19 RX ORDER — ONDANSETRON 4 MG/1
TABLET, FILM COATED ORAL EVERY 8 HOURS PRN
COMMUNITY
End: 2021-10-29

## 2021-10-19 NOTE — PROGRESS NOTES
"Paris is a 25 year old who is being evaluated via a billable video visit.      How would you like to obtain your AVS? MyChart  If the video visit is dropped, the invitation should be resent by: Send to e-mail at: [email or web address]  Will anyone else be joining your video visit? No      Video Start Time: 4:32 PM    Assessment & Plan     Dizziness  Headache on top of head  Nausea  Headache, dizziness and nausea over past 2 weeks, interfering with work. Denies palpitations. Work-up in ED reviewed with reassuring labs and EKG. Will proceed with CT to rule out any acute pathology. In the meantime, push fluids, rest, use Tylenol for headache if needed. If negative, will refer to vestibular PT. Schedule in-person appointment if symptoms persist for further evaluation.  - CT Head w/o contrast; Future    Viral rash  HSV (herpes simplex virus) infection  Refill provided.  - valACYclovir (VALTREX) 1000 mg tablet; Take 1 tablet (1,000 mg) by mouth daily    35 minutes spent on the date of the encounter doing chart review, history and exam, documentation and further activities per the note         Return if symptoms worsen or fail to improve.    SHAKIRA Pulido [facility] INTERNAL MEDICINE [place]    Rudolph Toledo is a 25 year old who presents for the following health issues     HPI     ED visit on 10/6/21. Had gone to [place] for a weekend, noticed the day before returning wasn't feeling the best, \"something was off\", blamed on not sleeping well for several days/being on the road. Continued feeling worse, with intense dizziness, not room-spinning, not really lightheaded, had to lay down, felt nauseated. Symptoms persisted despite, eating, pushing fluids, sleeping, but no improvement in symptoms. Was concerned enough that she couldn't put off evaluation. Had not been using marijuana, felt that the nurse in intake was trying to blame that. BP was fine. [place] scared and " agitated, fear of not knowing what was going on.   Denies palpitations, felt more in the moment d/t stress of testing, etc. EKG was normal, Holter ordered. Thinks her report of palpitations at the time was more anxiety related.   Since then, symptoms of intermittent, intense waves of dizziness where she has to stop what she's doing, put her head between her legs, and leads to vomiting.   120s/70s off Vyvanse, 130/82 back on the Vyvanse.   Going through a lot right now.  Meclizine makes her knocked out, nods off, is worried about not being able to function/work. Only using one 12.5 mg tablet.   Intense headaches accompany the dizziness.   Not constant, sometimes has periods of feeling fine or milder symptoms. Then will get a strong wave where she needs to stop what she's doing. This occurs about 1x per day, lasting anywhere from 15 minutes to all day. Has been very depressing. Head turned to the side, couldn't look at the TV. Bright lights/noises make it worse. Headaches right on the top of her head. Has had episodes of vomiting, feeling nauseated most of the time, 2-3 instances of emesis. Feels better lying down/prone with head to the side, laying back or straight up can make it worse. No positional changes for the headache. Headaches do not wake from sleep.  Had tried Epley maneuvers from online daily for 4-5 days a few times per day, didn't see an improvement.   Was concerned initially that the Vyvanse was contributing to her symptoms, but did not notice any improvement. Hasn't started the Duloxetine.   Takes Advil for the headaches, helps somewhat, goal is to decrease pain to help her be more functional. Stomach more upset d/t advil use.   Wants to be able to work from home, lights dimmer, quieter environment.   Face rash--viral, hsv, little vesicular bumps, itchy/tingling, can usually catch it right away, but worsens with the mask spreading.     Review of Systems   Constitutional, HEENT, cardiovascular,  pulmonary, gi and gu systems are negative, except as otherwise noted.      Objective           Vitals:  No vitals were obtained today due to virtual visit.    Physical Exam   GENERAL: Healthy, alert and no distress  EYES: Eyes grossly normal to inspection.  No discharge or erythema, or obvious scleral/conjunctival abnormalities.  RESP: No audible wheeze, cough, or visible cyanosis.  No visible retractions or increased work of breathing.    SKIN: Visible skin clear. No significant rash, abnormal pigmentation or lesions.  NEURO: Cranial nerves grossly intact.  Mentation and speech appropriate for age.  PSYCH: Mentation appears normal, affect normal/bright, judgement and insight intact, normal speech and appearance well-groomed.                Video-Visit Details    Type of service:  Video Visit    Video End Time:5:04 PM    Originating Location (pt. Location): Home    Distant Location (provider location):  [facility] INTERNAL MEDICINE [place]     Platform used for Video Visit: Schoo

## 2021-10-19 NOTE — NURSING NOTE
Chief Complaint   Patient presents with     Recheck Medication     ED follow up        ELYSSA Gross at 8:41 AM on 10/19/2021.

## 2021-10-25 ENCOUNTER — E-VISIT (OUTPATIENT)
Dept: URGENT CARE | Facility: URGENT CARE | Age: 26
End: 2021-10-25
Payer: COMMERCIAL

## 2021-10-25 DIAGNOSIS — N39.0 ACUTE UTI (URINARY TRACT INFECTION): Primary | ICD-10-CM

## 2021-10-25 PROCEDURE — 99422 OL DIG E/M SVC 11-20 MIN: CPT | Performed by: PHYSICIAN ASSISTANT

## 2021-10-25 RX ORDER — NITROFURANTOIN 25; 75 MG/1; MG/1
100 CAPSULE ORAL 2 TIMES DAILY
Qty: 10 CAPSULE | Refills: 0 | Status: SHIPPED | OUTPATIENT
Start: 2021-10-25 | End: 2021-10-30

## 2021-10-25 NOTE — PATIENT INSTRUCTIONS
Dear Paris Reddy    After reviewing your responses, I've been able to diagnose you with a urinary tract infection, which is a common infection of the bladder with bacteria.  This is not a sexually transmitted infection, though urinating immediately after intercourse can help prevent infections.  Drinking lots of fluids is also helpful to clear your current infection and prevent the next one.      I have sent a prescription for antibiotics to your pharmacy to treat this infection.    It is important that you take all of your prescribed medication even if your symptoms are improving after a few doses.  Taking all of your medicine helps prevent the symptoms from returning.     If your symptoms worsen, you develop pain in your back or stomach, develop fevers, or are not improving in 5 days, please contact your primary care provider for an appointment or visit any of our convenient Walk-in or Urgent Care Centers to be seen, which can be found on our website here.    Thanks again for choosing us as your health care partner,    Génesis Lang PA-C    Urinary Tract Infections in Women  Urinary tract infections (UTIs) are most often caused by bacteria. These bacteria enter the urinary tract. The bacteria may come from inside the body. Or they may travel from the skin outside the rectum or vagina into the urethra. Female anatomy makes it easy for bacteria from the bowel to enter a woman s urinary tract, which is the most common source of UTI. This means women develop UTIs more often than men. Pain in or around the urinary tract is a common UTI symptom. But the only way to know for sure if you have a UTI for the healthcare provider to test your urine. The two tests that may be done are the urinalysis and urine culture.     Types of UTIs    Cystitis. A bladder infection (cystitis) is the most common UTI in women. You may have urgent or frequent need to pee. You may also have pain, burning when you pee, and bloody  urine.    Urethritis. This is an inflamed urethra, which is the tube that carries urine from the bladder to outside the body. You may have lower stomach or back pain. You may also have urgent or frequent need to pee.    Pyelonephritis. This is a kidney infection. If not treated, it can be serious and damage your kidneys. In severe cases, you may need to stay in the hospital. You may have a fever and lower back pain.    Medicines to treat a UTI  Most UTIs are treated with antibiotics. These kill the bacteria. The length of time you need to take them depends on the type of infection. It may be as short as 3 days. If you have repeated UTIs, you may need a low-dose antibiotic for several months. Take antibiotics exactly as directed. Don t stop taking them until all of the medicine is gone. If you stop taking the antibiotic too soon, the infection may not go away. You may also develop a resistance to the antibiotic. This can make it much harder to treat.   Lifestyle changes to treat and prevent UTIs   The lifestyle changes below will help get rid of your UTI. They may also help prevent future UTIs.     Drink plenty of fluids. This includes water, juice, or other caffeine-free drinks. Fluids help flush bacteria out of your body.    Empty your bladder. Always empty your bladder when you feel the urge to pee. And always pee before going to sleep. Urine that stays in your bladder can lead to infection. Try to pee before and after sex as well.    Practice good personal hygiene. Wipe yourself from front to back after using the toilet. This helps keep bacteria from getting into the urethra.    Use condoms during sex. These help prevent UTIs caused by sexually transmitted bacteria. Also don't use spermicides during sex. These can increase the risk for UTIs. Choose other forms of birth control instead. For women who tend to get UTIs after sex, a low-dose of a preventive antibiotic may be used. Be sure to discuss this option with  your healthcare provider.    Follow up with your healthcare provider as directed. He or she may test to make sure the infection has cleared. If needed, more treatment may be started.  Cheryl last reviewed this educational content on 7/1/2019 2000-2021 [employer]. All rights reserved. This information is not intended as a substitute for professional medical care. Always follow your healthcare professional's instructions.

## 2021-10-28 ENCOUNTER — ANCILLARY PROCEDURE (OUTPATIENT)
Dept: CT IMAGING | Facility: CLINIC | Age: 26
End: 2021-10-28
Attending: NURSE PRACTITIONER
Payer: COMMERCIAL

## 2021-10-28 DIAGNOSIS — R11.0 NAUSEA: ICD-10-CM

## 2021-10-28 DIAGNOSIS — R42 DIZZINESS: ICD-10-CM

## 2021-10-28 DIAGNOSIS — R51.9 HEADACHE ON TOP OF HEAD: ICD-10-CM

## 2021-10-28 PROCEDURE — 70450 CT HEAD/BRAIN W/O DYE: CPT | Mod: GC | Performed by: RADIOLOGY

## 2021-10-29 ENCOUNTER — OFFICE VISIT (OUTPATIENT)
Dept: INTERNAL MEDICINE | Facility: CLINIC | Age: 26
End: 2021-10-29
Payer: COMMERCIAL

## 2021-10-29 VITALS — HEART RATE: 76 BPM | SYSTOLIC BLOOD PRESSURE: 119 MMHG | DIASTOLIC BLOOD PRESSURE: 82 MMHG | OXYGEN SATURATION: 100 %

## 2021-10-29 DIAGNOSIS — R11.0 NAUSEA: ICD-10-CM

## 2021-10-29 DIAGNOSIS — H81.10 BENIGN PAROXYSMAL POSITIONAL VERTIGO, UNSPECIFIED LATERALITY: ICD-10-CM

## 2021-10-29 DIAGNOSIS — R30.0 DYSURIA: Primary | ICD-10-CM

## 2021-10-29 DIAGNOSIS — G44.52 NEW DAILY PERSISTENT HEADACHE: ICD-10-CM

## 2021-10-29 PROCEDURE — 99214 OFFICE O/P EST MOD 30 MIN: CPT | Performed by: NURSE PRACTITIONER

## 2021-10-29 RX ORDER — ONDANSETRON 4 MG/1
4 TABLET, FILM COATED ORAL EVERY 6 HOURS PRN
Qty: 30 TABLET | Refills: 0 | Status: SHIPPED | OUTPATIENT
Start: 2021-10-29 | End: 2022-03-15

## 2021-10-29 RX ORDER — MECLIZINE HCL 12.5 MG 12.5 MG/1
25 TABLET ORAL 4 TIMES DAILY PRN
Qty: 30 TABLET | Refills: 0 | Status: SHIPPED | OUTPATIENT
Start: 2021-10-29 | End: 2022-03-15

## 2021-10-29 NOTE — PROGRESS NOTES
Assessment & Plan     Dysuria  Check UA/UC once antibiotics are complete if symptoms have not resolved. Push fluids.  - UA with Micro reflex to Culture; Future    New daily persistent headache  Head CT normal. Has hx of infrequent headaches, now with persistent headache with unknown triggers. Encouraged keeping a headache diary, avoid OTC medication overuse, work on adequate sleep and reviewed potential triggers. Referral provided to neurology.   - Adult Neurology Referral    Benign paroxysmal positional vertigo, unspecified laterality  Positive [place]-Hallpike maneuver on exam. Referral provided for vestibular PT. Meclizine has been mildly helpful, though makes her tired, can try using 12.5 mg to see if this helps without making her so sleepy. Avoid napping during the day to allow for better sleep hygiene/regular bedtime/waketime.   - meclizine (ANTIVERT) 12.5 MG tablet; Take 2 tablets (25 mg) by mouth 4 times daily as needed for dizziness  - Physical Therapy Referral; Future    Nausea  Refill provided.   - ondansetron (ZOFRAN) 4 MG tablet; Take 1 tablet (4 mg) by mouth every 6 hours as needed for nausea or vomiting      31 minutes spent on the date of the encounter doing chart review, history and exam, documentation and further activities per the note         Return in about 2-3 months (around 1/29/2022). or sooner prn if symptoms worsen    SHAKIRA Pulido CNP  [facility] INTERNAL MEDICINE [facility] is a 25 year old who presents for the following health issues     HPI     UTI--sx started ~10/24. Started macrobid, not sure if it's helping. Burning with urination, typically the only symptom she has. Hasn't had the frequency since starting it. Trying to push fluids.  Still having dizziness. Feels dizzy currently. Has waves of not feeling well/worsening, but generally low-level constant/persistent. No vision changes. If the dizziness is worse, harder to move the eyes  around, has to close her eyes.  Hard to describe, doesn't feel like she's going to pass out, but not like she's moving, almost like a pressure.    Headache is worse today. No known triggers, nothing consistent. Present on the top of her head, thought maybe related to tension with hairstyle but didn't make a difference to wear her hair down.  No numbness/tingling/weakess. Feeling more tired in general. Trying to alternate with Ibuprofen and Tylenol (each once daily when headache is present), increase in acid reflux. Tried famotidine and Tums, can still be uncomfortable.   No return of vomiting. Headaches can be distracting/can't get things done.   Feeling overwhelmed by symptoms.  No fevers. Otherwise feeling the exact same. Feeling more down because just not feeling well.   Has used meclizine, seems to help a little bit but does not fully resolve symptoms. Makes her tired. Ondansetron using infrequently.   Sleep pattern is off--not sleeping as well at night but then meclizine makes her nap more during the day.   Hx headaches (different/rare).     Review of Systems   Constitutional, HEENT, cardiovascular, pulmonary, gi and gu systems are negative, except as otherwise noted.      Objective    /82   Pulse 76   SpO2 100%   There is no height or weight on file to calculate BMI.  Physical Exam   GENERAL: healthy, alert and no distress  EYES: Eyes grossly normal to inspection, PERRL and conjunctivae and sclerae normal  HENT: ear canals and TM's normal, nose and mouth without ulcers or lesions, +[place]-Hallpike maneuver   NECK: no adenopathy, no asymmetry, masses, or scars and thyroid normal to palpation  RESP: lungs clear to auscultation - no rales, rhonchi or wheezes  CV: regular rate and rhythm, normal S1 S2, no S3 or S4, no murmur, click or rub, no peripheral edema and peripheral pulses strong  ABDOMEN: soft, nontender, no hepatosplenomegaly, no masses and bowel sounds normal  MS: no gross musculoskeletal defects  noted, no edema  NEURO: Normal strength and tone, sensory exam grossly normal, mentation intact, cranial nerves 2-12 intact, DTR's 2+ and symmetric, gait normal including heel/toe/tandem walking, Romberg normal and rapid alternating movements normal  PSYCH: mentation appears normal, flat affect

## 2021-10-29 NOTE — NURSING NOTE
Chief Complaint   Patient presents with     Medication Follow-up     Pt is here to follow up on medication.      Kathya Carpio LPN at 11:14 AM on 10/29/2021.

## 2021-10-29 NOTE — PATIENT INSTRUCTIONS
[facility] Medication Refill Request Information:  * Please contact your pharmacy regarding ANY request for medication refills.  ** [facility] Prescription Fax = [phone]  * Please allow 3 business days for routine medication refills.  * Please allow 5 business days for controlled substance medication refills.     [facility] Test Result notification information:  *You will be notified with in 7-10 days of your appointment day regarding the results of your test.  If you are on MyChart you will be notified as soon as the provider has reviewed the results and signed off on them.    [facility]: [phone]     Potential Lifestyle Triggers Influencing Onset or Severity of Migraine or Other Headaches  -Menses, ovulation or pregnancy  -Illness of any kind  -Intense or strenuous activity/exercise, especially if you're normally sedentary  -Altered sleep patterns: Too much, too little, jet lag, others  -Altered eating pattern: Fasting, missing meals  -Bright or flickering lights  -Excessive or repetitive noises  -Odors, fragrances, tobacco smoke  -Weather, seasonal changes  -High altitudes  -Medications including certain antidepressants, mental health medications, over-the-counter pain medication overuse, or birth control  -Stress or stress let down    Potential Dietary Triggers Influencing the Onset or Severity of Migraine or Other Headaches  Avoid eating the following. With some foods, small amounts may be tolerated if rarely eaten.  -Sour cream  -Ripened cheeses (chedder, Stilton, Brie, Camembert)  -Sausage, bologna, salami, pepperoni, summer sausage, hot dogs  -Pizza  -Chicken liver, diaz  -Herring (pickled or dried)  -Any pickled, fermented or marinated food  -MSG (found in soy sauce, meat tenderizers, seasoned salt)  -Freshly baked yeast products, sourdough bread  -Chocolate  -Nuts or nut butters (including peanut butter)  -Broad beans, lima beans, stevie beans, snow peas  -Onions  -Figs, raisins,  papayas, avocados, red plums  -Citrus fruits  -Bananas  -Caffeinated beverages (tea, coffee, cola, energy drinks, etc.)  -Alcoholic beverages (wine, beer, whiskey, etc.)  -Aspartame    Source: Diagnosis and treatment of primary headache in adults,  AHRQ, http://wwww.guideline.gov

## 2021-12-08 ENCOUNTER — VIRTUAL VISIT (OUTPATIENT)
Dept: BEHAVIORAL HEALTH | Facility: CLINIC | Age: 26
End: 2021-12-08
Payer: COMMERCIAL

## 2021-12-08 DIAGNOSIS — F33.1 MAJOR DEPRESSIVE DISORDER, RECURRENT EPISODE, MODERATE (H): Primary | ICD-10-CM

## 2021-12-08 PROCEDURE — 90834 PSYTX W PT 45 MINUTES: CPT | Mod: 95 | Performed by: MARRIAGE & FAMILY THERAPIST
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
## 2021-12-15 ENCOUNTER — VIRTUAL VISIT (OUTPATIENT)
Dept: BEHAVIORAL HEALTH | Facility: CLINIC | Age: 26
End: 2021-12-15
Payer: COMMERCIAL

## 2021-12-15 DIAGNOSIS — F33.1 MAJOR DEPRESSIVE DISORDER, RECURRENT EPISODE, MODERATE (H): Primary | ICD-10-CM

## 2021-12-15 PROCEDURE — 90834 PSYTX W PT 45 MINUTES: CPT | Mod: 95 | Performed by: MARRIAGE & FAMILY THERAPIST

## 2021-12-15 ASSESSMENT — PATIENT HEALTH QUESTIONNAIRE - PHQ9
SUM OF ALL RESPONSES TO PHQ QUESTIONS 1-9: 9
SUM OF ALL RESPONSES TO PHQ QUESTIONS 1-9: 9
10. IF YOU CHECKED OFF ANY PROBLEMS, HOW DIFFICULT HAVE THESE PROBLEMS MADE IT FOR YOU TO DO YOUR WORK, TAKE CARE OF THINGS AT HOME, OR GET ALONG WITH OTHER PEOPLE: SOMEWHAT DIFFICULT
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
## 2021-12-16 ASSESSMENT — PATIENT HEALTH QUESTIONNAIRE - PHQ9: SUM OF ALL RESPONSES TO PHQ QUESTIONS 1-9: 9

## 2021-12-27 ENCOUNTER — TELEPHONE (OUTPATIENT)
Dept: INTERNAL MEDICINE | Facility: CLINIC | Age: 26
End: 2021-12-27
Payer: COMMERCIAL

## 2021-12-27 NOTE — TELEPHONE ENCOUNTER
TEST ENCOUNTER DISREGARD!!!    [facility] INTERNAL MEDICINE [place]  Dept: [phone]     Patient: Paris Reddy   : 1995  MRN: [id number]  Encounter: 21        Pre Visit Assessment    Health Maintenance Due   Topic Date Due     ADVANCE CARE PLANNING  Never done     DEPRESSION ACTION PLAN  Never done     HIV SCREENING  Never done     HEPATITIS C SCREENING  Never done     PREVENTIVE CARE VISIT  2020     PAP  2020     COVID-19 Vaccine (3 - Booster for Pfizer series) 2021           Paris Reddy at 2:26 PM on 2021

## 2022-01-22 ENCOUNTER — HEALTH MAINTENANCE LETTER (OUTPATIENT)
Age: 27
End: 2022-01-22

## 2022-01-28 ENCOUNTER — VIRTUAL VISIT (OUTPATIENT)
Dept: BEHAVIORAL HEALTH | Facility: CLINIC | Age: 27
End: 2022-01-28
Payer: COMMERCIAL

## 2022-01-28 DIAGNOSIS — F33.1 MAJOR DEPRESSIVE DISORDER, RECURRENT EPISODE, MODERATE (H): Primary | ICD-10-CM

## 2022-01-28 PROCEDURE — 90837 PSYTX W PT 60 MINUTES: CPT | Mod: 95 | Performed by: MARRIAGE & FAMILY THERAPIST

## 2022-01-28 ASSESSMENT — PATIENT HEALTH QUESTIONNAIRE - PHQ9
10. IF YOU CHECKED OFF ANY PROBLEMS, HOW DIFFICULT HAVE THESE PROBLEMS MADE IT FOR YOU TO DO YOUR WORK, TAKE CARE OF THINGS AT HOME, OR GET ALONG WITH OTHER PEOPLE: SOMEWHAT DIFFICULT
SUM OF ALL RESPONSES TO PHQ QUESTIONS 1-9: 9
SUM OF ALL RESPONSES TO PHQ QUESTIONS 1-9: 9
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
## 2022-01-29 ASSESSMENT — PATIENT HEALTH QUESTIONNAIRE - PHQ9: SUM OF ALL RESPONSES TO PHQ QUESTIONS 1-9: 9

## 2022-02-11 ENCOUNTER — VIRTUAL VISIT (OUTPATIENT)
Dept: BEHAVIORAL HEALTH | Facility: CLINIC | Age: 27
End: 2022-02-11
Payer: COMMERCIAL

## 2022-02-11 DIAGNOSIS — Z91.199 NO-SHOW FOR APPOINTMENT: Primary | ICD-10-CM

## 2022-02-24 ENCOUNTER — MYC MEDICAL ADVICE (OUTPATIENT)
Dept: BEHAVIORAL HEALTH | Facility: CLINIC | Age: 27
End: 2022-02-24
Payer: COMMERCIAL

## 2022-02-25 ENCOUNTER — VIRTUAL VISIT (OUTPATIENT)
Dept: BEHAVIORAL HEALTH | Facility: CLINIC | Age: 27
End: 2022-02-25
Payer: COMMERCIAL

## 2022-02-25 DIAGNOSIS — F33.1 MAJOR DEPRESSIVE DISORDER, RECURRENT EPISODE, MODERATE (H): Primary | ICD-10-CM

## 2022-02-25 PROCEDURE — 90837 PSYTX W PT 60 MINUTES: CPT | Mod: 95 | Performed by: MARRIAGE & FAMILY THERAPIST

## 2022-02-25 NOTE — PROGRESS NOTES
[facility] (PCC referral)  February 25, 2021  Video Visit      Behavioral Health Clinician Progress Note  Video Visit    Patient Name: Paris Reddy           Service Type:  Individual      Service Location:   Video Visit     Session Start Time: 330pm  Session End Time: 430pm      Session Length: 53 - 60       Attendees: Patient    Visit Activities (Refresh list every visit): [facility] Only    Diagnostic Assessment Date: 9/26/2019  Treatment Plan Review Date: 5/1/2020   CGI-S: 4   CGI-I 2    See Flowsheets for today's PHQ-9 and ANGUS-7 results  Previous PHQ-9:   PHQ-9 SCORE 9/28/2021 12/15/2021 1/28/2022   PHQ-9 Total Score MyChart 11 (Moderate depression) 9 (Mild depression) 9 (Mild depression)   PHQ-9 Total Score 11 9 9     Previous ANGUS-7:   ANGUS-7 SCORE 3/10/2021 8/31/2021 9/28/2021   Total Score - - 4 (minimal anxiety)   Total Score 3 3 4       PITER LEVEL:  No flowsheet data found.    DATA  Extended Session (60+ minutes): No  Interactive Complexity: No  Crisis: No  [facility] Patient: No    Treatment Objective(s) Addressed in This Session:  Target Behavior(s): disease management/lifestyle changes      Patient  will experience a reduction in depressed mood, will develop more effective coping skills to manage depressive symptoms, will develop healthy cognitive patterns and beliefs and will increase ability to function adaptively, will experience a reduction in anxiety, will develop more effective coping skills to manage anxiety symptoms, will develop healthy cognitive patterns and beliefs and will increase ability to function adaptively and will develop coping/problem-solving skills to facilitate more adaptive adjustment    Current Stressors / Issues:  Telemedicine Visit: The patient's condition can be safely assessed and treated via synchronous audio and visual telemedicine encounter.      Reason for Telemedicine Visit: Covid-19 pandemic    Originating Site (Patient Location): Patient's  "home    Distant Site (Provider Location): Provider Remote Setting    Consent:  The patient/guardian has verbally consented to: the potential risks and benefits of telemedicine (video visit) versus in person care; bill my insurance or make self-payment for services provided; and responsibility for payment of non-covered services.     Mode of Communication:  Video Conference via [employer]    As the provider I attest to compliance with applicable laws and regulations related to telemedicine.      [facility] met with Paris to provide psychotherapy support regarding anxiety and depression, life stress.     Elham reports increased depression and stress - at work and in her life outside of work. Her trauma-related symptoms still \"pop up\" she reports. Her sleep is not great, either, she reports. She is not suicidal, but says she feels a little part of her wants to give up.      She feels as if her situations are \"solvable\" but she feels overwhelmed by them right now, unable, helpless. Discussed the solutions she has in her mind, and what the benefit would be of pursuing them. Work is a significant stress and the issues here are key to her current stress, although it does appear she is still recovering from her experiences of trauma.    Focused in on work situation for some length today. Explored the current state and stressors, and the possible ways to move forward. Worked to identify the people and resources she could turn towards with this set of issues. She is not getting paid any more for being in an ongoing informal position. 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 She feels stress from both the front line and form leadership on this, but is not sure how to manage all of this. There is one particular employee who is very critical because they had wanted the position.     Explored possible solutions she could pursue at work. Discussed how " "she may be experiencing burnout, and the associated depression. She is not confident that anything will change if she brings this all up with her supervisor.     [place] helplessness - her fear is that nothing helpful will happen. Discussed how to be direct and challenge what she is getting from her supervisor (and me). Discussed how she does a good job with this in session and it is okay to not know what she needs - and to ask for help from those who can help her figure that out. Encouraged exploring support from others, as well.    Revisited the idea of learned helplessness. Discussed the several areas that cause her distress, how \"helpless and hopeless at the same time\" is a recipe for depression, the pinball machine metaphor for multiple issues, and taking one step at at time with support as a way forward.    We agree to follow up in a week or so, continue meeting regularly.    Progress on Treatment Objective(s) / Homework:  Satisfactory progress - ACTION (Actively working towards change); Intervened by reinforcing change plan / affirming steps taken    Interventions drawn from:  Psycho-education regarding mental health diagnoses and treatment options    Motivational Interviewing    Solution-Focused Therapy    Explored patterns in patient's behaviors and relationships and discussed options for new behaviors    Explored new options for problem-solving, communication, managing stress, etc.    Cognitive-behavioral Therapy    Discussed common cognitive distortions, identified them in patient's life    Explored ways to challenge, replace, and act against these cognitions    Explore behavioral changes that might benefit patient in improving mood and engage in meaningful activity    Acceptance and Commitment Therapy    Explored and identified important values in patient's life    Discussed ways to commit to behavioral activation around these values    Behavioral Activation    Discussed steps patient can take to become " more involved in meaningful activity    Identified barriers to these activities and explored possible solutions    Medication Review:  No problems reported to [facility] [place] today.    Medication Compliance:  No problems reported to [facility] today.    Changes in Health Issues:  No problems reported to [facility] [place] today.    Chemical Use Review:   Substance Use: Chemical use reviewed, no active concerns identified      Tobacco Use: No current tobacco use.      Mental Status Assessment:  Appearance:   Appropriate   Eye Contact:   Good   Psychomotor Behavior: Normal   Attitude:   Cooperative   Orientation:   All  Speech   Rate / Production: Normal    Volume:  Normal   Mood:    Anxious  Depressed - fluctuates, contextual in nature  Affect:    Appropriate  Subdued   Thought Content:  Clear  Rumination   Thought Form:  Coherent  Logical   Insight:    Good     Safety Assessment:  Patient has had a history of suicidal ideation: some passive thoughts when she was younger - no plans, no intent, no attempts  Patient denies current or recent suicidal ideation or behaviors.  Patient denies current or recent homicidal ideation or behaviors.  Patient denies current or recent self injurious behavior or ideation.  Patient denies other safety concerns.  Patient reports there are no firearms in the house  Protective Factors Life Satisfaction, Reality testing ability, Positive coping skills, Positive problem-solving skills and Positive social support   Risk Factors Current high stress    Plan for Safety and Risk Management:  A safety and risk management plan has not been developed at this time, however patient was encouraged to call [facility] should there be a change in any of these risk factors.    Diagnoses:  1. Major depressive disorder, recurrent episode, moderate (H)    Consider PTSD, complex/developmental PTSD?    Collateral Reports Completed:  Will collaborate with care team as indicated during treatment    Plan: (Homework, other):  Patient was  given information about behavioral services and encouraged to schedule a follow up appointment with the clinic [facility] [place] as needed.  She was also given information about mental health symptoms and treatment options  and Cognitive Behavioral Therapy skills to practice when experiencing anxiety and depression.  CD Recommendations: No indications of CD issues. We agree to continue this course of psychotherapy to address anxiety, depression, stress.  KATHERINE Neal, [facility] [place]    ______________________________________________________________________    [facility] Integrated Behavioral Health Treatment Plan    Client's Name: Paris Reddy  YOB: 1995    Date: 5/1/2020    DSM5 Diagnoses: (Sustained by DSM5 Criteria Listed Above)  Diagnoses: 296.22 (F32.1)  Major Depressive Disorder, Single Episode, Moderate With anxious distress  Psychosocial & Contextual Factors: social stressors, possible hx of childhood trauma  WHODAS Score: 20  See Media section of EPIC medical record for completed WHODAS  CGI-S: 4  CGI-I: 2    Referral / Collaboration:  Will collaborate with care team as indicated during treatment.    Anticipated number of session or this episode of care: 5-10      MeasurableTreatment Goal(s) related to diagnosis / functional impairment(s)  Goal 1:  Patient will experience a reduction in depressive and anxious symptoms, along with a corresponding increase in positive emotion and life satisfaction.    Objective #A: Patient will experience a reduction in depressed mood, will develop more effective coping skills to manage depressive symptoms, will develop healthy cognitive patterns and beliefs, will increase ability to function adaptively and will continue to take medications as prescribed / participate in supportive activities and services    Status: Continued - Date(s): 5/1/2020    Objective #B: Patient will experience a reduction in anxiety, will develop more effective coping skills to manage anxiety symptoms,  will develop healthy cognitive patterns and beliefs and will increase ability to function adaptively  Status: Continued - Date(s): 5/1/2020    Objective #C: Patient will develop better understanding of triggers and coping strategies to stabilize mood  Status: Continued - Date(s): 5/1/2020    Goal 2:  Patient will identify and increase engagement in valued activity, i.e. improving social connections/relationships, pursuing occupational goals or personally meaningful pursuits, exploration of meaning in life.     Objective #A: Patient will identify meaningful activity in social, occupational and  personal goals, and increase behavioral activation around these goals   Status: Continued - Date(s): 5/1/2020    Objective #B: Patient will address relationship difficulties in a more adaptive manner  Status: Continued - Date(s): 5/1/2020    Objective #C: Patient will develop coping/problem-solving skills to facilitate more adaptive adjustment and will effectively address problems that interfere with adaptive functioning  Status: Continued - Date(s): 5/1/2020    Possible Therapeutic Intervention(s)  Psycho-education regarding mental health diagnoses and treatment options    Eye-Movement Desensitization and Reprocessing Therapy    Clinical Hypnosis    Skills training    Explore skills useful to client in current situation.    Skills include assertiveness, communication, conflict management, problem-solving, relaxation, etc.    Solution-Focused Therapy    Explore patterns in patient's relationships and discuss options for new behaviors.    Explore patterns in patient's actions and choices and discuss options for new behaviors.    Cognitive-behavioral Therapy    Discuss common cognitive distortions, identify them in patient's life.    Explore ways to challenge, replace, and act against these cognitions.    Acceptance and Commitment Therapy    Explore and identify important values in patient's life.    Discuss ways to commit to  behavioral activation around these values.    Psychodynamic psychotherapy    Discuss patient's emotional dynamics and issues and how they impact behaviors.    Explore patient's history of relationships and how they impact present behaviors.    Explore how to work with and make changes in these schemas and patterns.    Narrative Therapy    Explore the patient's story of his/her life from his/her perspective.    Explore alternate ways of understanding their experience, identifying exceptions, developing new themes.    Interpersonal Psychotherapy    Explore patterns in relationships that are effective or ineffective at helping patient reach their goals, find satisfying experience.    Discuss new patterns or behaviors to engage in for improved social functioning.    Behavioral Activation    Discuss steps patient can take to become more involved in meaningful activity.    Identify barriers to these activities and explore possible solutions.    Mindfulness-Based Strategies    Discuss skills based on development and application of mindfulness.    Skills drawn from compassion-focused therapy, dialectical behavior therapy, mindfulness-based stress reduction, mindfulness-based cognitive therapy, etc.      We have developed these goals together during our work to this point. Patient has assisted in the development of these goals and has agreed to this treatment plan.       KATHERINE French, [facility]  5/1/2020

## 2022-03-11 ENCOUNTER — VIRTUAL VISIT (OUTPATIENT)
Dept: BEHAVIORAL HEALTH | Facility: CLINIC | Age: 27
End: 2022-03-11
Payer: COMMERCIAL

## 2022-03-11 DIAGNOSIS — F33.1 MAJOR DEPRESSIVE DISORDER, RECURRENT EPISODE, MODERATE (H): Primary | ICD-10-CM

## 2022-03-11 PROCEDURE — 90791 PSYCH DIAGNOSTIC EVALUATION: CPT | Mod: 95 | Performed by: MARRIAGE & FAMILY THERAPIST

## 2022-03-11 ASSESSMENT — PATIENT HEALTH QUESTIONNAIRE - PHQ9
SUM OF ALL RESPONSES TO PHQ QUESTIONS 1-9: 8
10. IF YOU CHECKED OFF ANY PROBLEMS, HOW DIFFICULT HAVE THESE PROBLEMS MADE IT FOR YOU TO DO YOUR WORK, TAKE CARE OF THINGS AT HOME, OR GET ALONG WITH OTHER PEOPLE: SOMEWHAT DIFFICULT
SUM OF ALL RESPONSES TO PHQ QUESTIONS 1-9: 8
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
## 2022-03-12 ASSESSMENT — PATIENT HEALTH QUESTIONNAIRE - PHQ9: SUM OF ALL RESPONSES TO PHQ QUESTIONS 1-9: 8

## 2022-03-15 ENCOUNTER — VIRTUAL VISIT (OUTPATIENT)
Dept: INTERNAL MEDICINE | Facility: CLINIC | Age: 27
End: 2022-03-15
Payer: COMMERCIAL

## 2022-03-15 DIAGNOSIS — F41.9 ANXIETY: ICD-10-CM

## 2022-03-15 DIAGNOSIS — R11.0 NAUSEA: ICD-10-CM

## 2022-03-15 DIAGNOSIS — F33.1 MODERATE EPISODE OF RECURRENT MAJOR DEPRESSIVE DISORDER (H): Primary | ICD-10-CM

## 2022-03-15 DIAGNOSIS — F50.819 BINGE EATING DISORDER: ICD-10-CM

## 2022-03-15 PROCEDURE — 99214 OFFICE O/P EST MOD 30 MIN: CPT | Mod: 95 | Performed by: NURSE PRACTITIONER

## 2022-03-15 RX ORDER — ONDANSETRON 4 MG/1
4 TABLET, FILM COATED ORAL EVERY 6 HOURS PRN
Qty: 30 TABLET | Refills: 0 | Status: SHIPPED | OUTPATIENT
Start: 2022-03-15

## 2022-03-15 RX ORDER — DULOXETIN HYDROCHLORIDE 20 MG/1
CAPSULE, DELAYED RELEASE ORAL
Qty: 67 CAPSULE | Refills: 1 | Status: SHIPPED | OUTPATIENT
Start: 2022-03-15 | End: 2022-06-07

## 2022-03-15 RX ORDER — LISDEXAMFETAMINE DIMESYLATE 50 MG/1
50 CAPSULE ORAL DAILY
Qty: 30 CAPSULE | Refills: 0 | Status: SHIPPED | OUTPATIENT
Start: 2022-03-15
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
## 2022-03-29 ENCOUNTER — TELEPHONE (OUTPATIENT)
Dept: INTERNAL MEDICINE | Facility: CLINIC | Age: 27
End: 2022-03-29
Payer: COMMERCIAL

## 2022-04-08 ENCOUNTER — VIRTUAL VISIT (OUTPATIENT)
Dept: BEHAVIORAL HEALTH | Facility: CLINIC | Age: 27
End: 2022-04-08
Payer: COMMERCIAL

## 2022-04-08 DIAGNOSIS — F33.1 MAJOR DEPRESSIVE DISORDER, RECURRENT EPISODE, MODERATE (H): Primary | ICD-10-CM

## 2022-04-08 PROCEDURE — 90832 PSYTX W PT 30 MINUTES: CPT | Mod: 95 | Performed by: MARRIAGE & FAMILY THERAPIST

## 2022-04-08 NOTE — PROGRESS NOTES
[facility] (PCC referral)  April 7, 2022  Video Visit      Behavioral Health Clinician Progress Note  Video Visit    Patient Name: Paris Reddy           Service Type:  Individual      Service Location:   Video Visit     Session Start Time: 318pm  Session End Time: 356pm      Session Length: 16 - 37       Attendees: Patient    Visit Activities (Refresh list every visit): [facility] Only    Diagnostic Assessment Date: 3/11/2022  Treatment Plan Review Date: 3/11/2022    See Flowsheets for today's PHQ-9 and ANGUS-7 results  Previous PHQ-9:   PHQ-9 SCORE 12/15/2021 1/28/2022 3/11/2022   PHQ-9 Total Score MyChart 9 (Mild depression) 9 (Mild depression) 8 (Mild depression)   PHQ-9 Total Score 9 9 8     Previous ANGUS-7:   ANGUS-7 SCORE 3/10/2021 8/31/2021 9/28/2021   Total Score - - 4 (minimal anxiety)   Total Score 3 3 4       PITER LEVEL:  No flowsheet data found.    DATA  Extended Session (60+ minutes): No  Interactive Complexity: No  Crisis: No  [facility] Patient: No    Treatment Objective(s) Addressed in This Session:  Target Behavior(s): disease management/lifestyle changes      Patient  will experience a reduction in depressed mood, will develop more effective coping skills to manage depressive symptoms, will develop healthy cognitive patterns and beliefs and will increase ability to function adaptively, will experience a reduction in anxiety, will develop more effective coping skills to manage anxiety symptoms, will develop healthy cognitive patterns and beliefs and will increase ability to function adaptively and will develop coping/problem-solving skills to facilitate more adaptive adjustment    Current Stressors / Issues:  Telemedicine Visit: The patient's condition can be safely assessed and treated via synchronous audio and visual telemedicine encounter.      Reason for Telemedicine Visit: Covid-19 pandemic    Originating Site (Patient Location): Patient's home    Distant Site (Provider Location):  "Provider Remote Setting    Consent:  The patient/guardian has verbally consented to: the potential risks and benefits of telemedicine (video visit) versus in person care; bill my insurance or make self-payment for services provided; and responsibility for payment of non-covered services.     Mode of Communication:  Video Conference via [employer]    As the provider I attest to compliance with applicable laws and regulations related to telemedicine.      [facility] met with Paris to provide psychotherapy support regarding anxiety and depression, life stress.     Reviewed her attempts to work with CBT tools and the emotional resistance she felt. Discussed how she may have tried to work with experiences that are too distressing - her recent traumas. Discussed trying the tools with lesser stresses, such as work. Also discussed expectation and hopes we all have for fast change, and how patient and persistent effort, along with a willingness to feel difficult emotions and be with them while we engage in valued and healthy behaviors, is odell to growth over time. Doroteo on her appreciation of the ACT approach found in the Dario Pollock article Embracing your demons, which she has read.     Also discussed trauma and how \"negative learning\" can happen so fast and be so challenging to change, discussing an evolutionary perspective on survival. Discussed some approaches to working with trauma that would involve \"exposure\" as well as CBT/ACT skills - such as CPT. We may approach these in the future, but she reports she would rather begin with some of the work-related stress as a focus for now. She will griffin the resources I have provided a session or two ago, and we will explore her work at our next meeting.     We agree to follow up in a month or so, continue meeting regularly.    Progress on Treatment Objective(s) / Homework:  Satisfactory progress - ACTION (Actively working towards change); Intervened by reinforcing change plan / " affirming steps taken    Interventions drawn from:  Psycho-education regarding mental health diagnoses and treatment options    Motivational Interviewing    Solution-Focused Therapy    Explored patterns in patient's behaviors and relationships and discussed options for new behaviors    Explored new options for problem-solving, communication, managing stress, etc.    Cognitive-behavioral Therapy    Discussed common cognitive distortions, identified them in patient's life    Explored ways to challenge, replace, and act against these cognitions    Explore behavioral changes that might benefit patient in improving mood and engage in meaningful activity    Acceptance and Commitment Therapy    Explored and identified important values in patient's life    Discussed ways to commit to behavioral activation around these values    Behavioral Activation    Discussed steps patient can take to become more involved in meaningful activity    Identified barriers to these activities and explored possible solutions    Medication Review:  No problems reported to [facility] today.    Medication Compliance:  No problems reported to [facility] today.    Changes in Health Issues:  No problems reported to [facility] today.    Chemical Use Review:   Substance Use: Chemical use reviewed, no active concerns identified      Tobacco Use: No current tobacco use.      Mental Status Assessment:  Appearance:   Appropriate   Eye Contact:   Good   Psychomotor Behavior: Normal   Attitude:   Cooperative   Orientation:   All  Speech   Rate / Production: Normal    Volume:  Normal   Mood:    Anxious  Depressed - fluctuates, contextual in nature  Affect:    Appropriate  Subdued   Thought Content:  Clear  Rumination   Thought Form:  Coherent  Logical   Insight:    Good     Safety Assessment:  Patient has had a history of suicidal ideation: some passive thoughts when she was younger - no plans, no intent, no attempts  Patient denies current or recent suicidal ideation or  behaviors.  Patient denies current or recent homicidal ideation or behaviors.  Patient denies current or recent self injurious behavior or ideation.  Patient denies other safety concerns.  Patient reports there are no firearms in the house  Protective Factors Life Satisfaction, Reality testing ability, Positive coping skills, Positive problem-solving skills and Positive social support   Risk Factors Current high stress    Plan for Safety and Risk Management:  A safety and risk management plan has not been developed at this time, however patient was encouraged to call [place] [phone] should there be a change in any of these risk factors.    Diagnoses:  1. Major depressive disorder, recurrent episode, moderate (H)    Consider PTSD    Collateral Reports Completed:  Will collaborate with care team as indicated during treatment    Plan: (Homework, other):  Patient was given information about behavioral services and encouraged to schedule a follow up appointment with the clinic [facility] as needed.  She was also given information about mental health symptoms and treatment options  and Cognitive Behavioral Therapy skills to practice when experiencing anxiety and depression.  CD Recommendations: No indications of CD issues. We agree to continue this course of psychotherapy to address anxiety, depression, stress.  KATHERINE Neal, [facility]    ______________________________________________________________________                                                Individual Treatment Plan    Patient's Name: Paris Reddy YOB: 1995    Date of Creation: 5/1/2020  Date Treatment Plan Last Reviewed/Revised: 3/11/2022    Diagnoses: 296.22 (F32.1)  Major Depressive Disorder, Single Episode, Moderate With anxious distress  Psychosocial & Contextual Factors: social stressors, hx of childhood trauma/recent traumatic experience, work stress  PROMIS (reviewed every 90 days): 19    Referral / Collaboration:  Referral to  another professional/service is not indicated at this time..    Anticipated number of session for this episode of care: 10+  Anticipation frequency of session: Biweekly  Anticipated Duration of each session: 38-52 minutes  Treatment plan will be reviewed in 90 days or when goals have been changed.       MeasurableTreatment Goal(s) related to diagnosis / functional impairment(s)  Goal 1:  Patient will experience a reduction in depressive and anxious symptoms, along with a corresponding increase in positive emotion and life satisfaction.    Objective #A: Patient will experience a reduction in depressed mood, will develop more effective coping skills to manage depressive symptoms, will develop healthy cognitive patterns and beliefs, will increase ability to function adaptively and will continue to take medications as prescribed / participate in supportive activities and services    Status: Continued - Date(s): 3/11/2022    Objective #B: Patient will experience a reduction in anxiety, will develop more effective coping skills to manage anxiety symptoms, will develop healthy cognitive patterns and beliefs and will increase ability to function adaptively  Status: Continued - Date(s): 3/11/2022    Objective #C: Patient will develop better understanding of triggers and coping strategies to stabilize mood  Status: Continued - Date(s): 3/11/2022    Intervention(s)  Therapist will support development and implementation of Cognitive-Behavioral Therapy skills:    Discuss common cognitive distortions, identify them in patient's life    Explore ways to challenge, replace, and act against these cognitions    Explore behavioral changes that might benefit patient in improving mood and engage in meaningful activity    Therapist will support development and implementation of Acceptance and Commitment Therapy skills:    Explore aspects of psychological flexibility: de-fusion, self-as-context/observing self, acceptance, present moment  awareness, values, and committed action.    Develop mindfulness and acceptance skills.    Identify important values in patient's life, and discuss ways to commit to behavioral activation around these values    Therapist will support development and implementation of emotional awareness and emotional self-regulation skills.      Goal 2:  Patient will identify and increase engagement in valued activity, i.e. improving social connections/relationships, pursuing occupational goals or personally meaningful pursuits, exploration of meaning in life.     Objective #A: Patient will identify meaningful activity in social, occupational and  personal goals, and increase behavioral activation around these goals   Status: Continued - Date(s): 3/11/2022    Objective #B: Patient will address relationship difficulties in a more adaptive manner  Status: Continued - Date(s): 3/11/2022    Objective #C: Patient will develop coping/problem-solving skills to facilitate more adaptive adjustment and will effectively address problems that interfere with adaptive functioning  Status: Continued - Date(s): 3/11/2022    Intervention(s)  Therapist will support development and implementation of Cognitive-Behavioral Therapy skills:    Discuss common cognitive distortions, identify them in patient's life    Explore ways to challenge, replace, and act against these cognitions    Explore behavioral changes that might benefit patient in improving mood and engage in meaningful activity    Therapist will support development and implementation of Acceptance and Commitment Therapy skills:    Explore aspects of psychological flexibility: de-fusion, self-as-context/observing self, acceptance, present moment awareness, values, and committed action.    Develop mindfulness and acceptance skills.    Identify important values in patient's life, and discuss ways to commit to behavioral activation around these values    Therapist will assist in exploration of  relationship skills, including conflict management and cultivation of strong relationships.     Therapist and patient will explore patterns in relationships that are effective or ineffective at helping patient reach their goals, find satisfying experience, and discuss new patterns or behaviors to engage in for improved social functioning.      Patient has reviewed and agreed to the above plan.      Renato Maloney MA, LM, Behavioral Health Clinician  March 11, 2022

## 2022-06-07 DIAGNOSIS — F33.1 MODERATE EPISODE OF RECURRENT MAJOR DEPRESSIVE DISORDER (H): ICD-10-CM

## 2022-06-07 DIAGNOSIS — F50.819 BINGE EATING DISORDER: Primary | ICD-10-CM

## 2022-06-07 DIAGNOSIS — F41.9 ANXIETY: ICD-10-CM

## 2022-06-07 RX ORDER — LISDEXAMFETAMINE DIMESYLATE 50 MG/1
50 CAPSULE ORAL DAILY
Qty: 30 CAPSULE | Refills: 0 | Status: SHIPPED | OUTPATIENT
Start: 2022-07-08 | End: 2022-08-07

## 2022-06-07 RX ORDER — LISDEXAMFETAMINE DIMESYLATE 50 MG/1
50 CAPSULE ORAL DAILY
Qty: 30 CAPSULE | Refills: 0 | Status: SHIPPED | OUTPATIENT
Start: 2022-08-08 | End: 2022-09-07

## 2022-06-07 RX ORDER — LISDEXAMFETAMINE DIMESYLATE 50 MG/1
50 CAPSULE ORAL DAILY
Qty: 30 CAPSULE | Refills: 0 | Status: SHIPPED | OUTPATIENT
Start: 2022-06-07 | End: 2022-07-07

## 2022-06-07 RX ORDER — DULOXETIN HYDROCHLORIDE 20 MG/1
20 CAPSULE, DELAYED RELEASE ORAL 2 TIMES DAILY
Qty: 180 CAPSULE | Refills: 1 | Status: SHIPPED | OUTPATIENT
Start: 2022-06-07 | End: 2022-12-04

## 2022-09-03 ENCOUNTER — HEALTH MAINTENANCE LETTER (OUTPATIENT)
Age: 27
End: 2022-09-03

## 2023-04-29 ENCOUNTER — HEALTH MAINTENANCE LETTER (OUTPATIENT)
Age: 28
End: 2023-04-29

## 2023-05-16 ENCOUNTER — OFFICE VISIT (OUTPATIENT)
Dept: URGENT CARE | Facility: URGENT CARE | Age: 28
End: 2023-05-16
Payer: COMMERCIAL

## 2023-05-16 VITALS
TEMPERATURE: 97.7 F | SYSTOLIC BLOOD PRESSURE: 131 MMHG | WEIGHT: 251 LBS | DIASTOLIC BLOOD PRESSURE: 83 MMHG | RESPIRATION RATE: 18 BRPM | OXYGEN SATURATION: 97 % | BODY MASS INDEX: 43.08 KG/M2 | HEART RATE: 68 BPM

## 2023-05-16 DIAGNOSIS — B35.8 FACIAL RINGWORM: Primary | ICD-10-CM

## 2023-05-16 PROCEDURE — 99213 OFFICE O/P EST LOW 20 MIN: CPT | Performed by: NURSE PRACTITIONER

## 2023-05-16 RX ORDER — DULOXETIN HYDROCHLORIDE 30 MG/1
3 CAPSULE, DELAYED RELEASE ORAL DAILY
COMMUNITY
Start: 2022-08-31 | End: 2023-08-31

## 2023-05-16 RX ORDER — CLOTRIMAZOLE 1 %
CREAM (GRAM) TOPICAL 2 TIMES DAILY
Qty: 85 G | Refills: 0 | Status: SHIPPED | OUTPATIENT
Start: 2023-05-16

## 2023-05-16 NOTE — PROGRESS NOTES
Assessment & Plan     Facial ringworm    - clotrimazole (LOTRIMIN) 1 % external cream  Dispense: 85 g; Refill: 0     Rash consistent with ringworm. Prescription sent to pharmacy for clotrimazole twice daily for 1-3 weeks, may use OTC if cheaper. Try not to scratch. May take oral zyrtec daily. Contagiousness discussed. Wash hands often, pillowcase, towels.     Follow-up with PCP if symptoms persist for 7 days, and sooner if symptoms worsen or new symptoms develop.     Discussed red flag symptoms which warrant immediate visit in emergency room    All questions were answered and patient verbalized understanding. AVS reviewed with patient.     Taryn Le, DNP, APRN, CNP 5/16/2023 1:36 PM  [facility] URGENT CARE [place]          Rudolph Toledo is a 27 year old female who presents to clinic today for the following health issues:  Chief Complaint   Patient presents with     Urgent Care     Derm Problem     Per patient has two spots on face that appeared about 4 days ago rash looks blotchy, red, and itchy.      Rash    Onset of rash was 4 day(s) ago.   Course of illness is worsening.  Current and Associated symptoms: redness, itching with raised borders  Location of the rash: face: 2 spots. No rash elsewhere on body  Previous history of a similar rash? No  Recent exposure history: none known, has three cats which are not new  Associated symptoms include: none  Denies throat tightness, sore throat, wheezing, shortness of breath, tongue/lip swelling, fever, cough, nausea and vomiting.  Treatment measures tried include: hydrocortisone cream hasn't helped  No known tick bite    Problem list, Medication list, Allergies, and Medical history reviewed in EPIC.    ROS:  Review of systems negative except for noted above        Objective    /83   Pulse 68   Temp 97.7  F (36.5  C) (Tympanic)   Resp 18   Wt 113.9 kg (251 lb)   SpO2 97%   BMI 43.08 kg/m    Physical Exam  Constitutional:       General: She  is not in acute distress.     Appearance: She is not toxic-appearing or diaphoretic.   HENT:      Head: Normocephalic and atraumatic.      Mouth/Throat:      Comments: No angioedema currently  Skin:     General: Skin is warm and dry.      Findings: Rash present.      Comments: Two oval erythematous approx 8 mm lesions with raised borders without drainage or increased warmth on right cheek and chin   Neurological:      Mental Status: She is alert.

## 2024-03-12 ENCOUNTER — OFFICE VISIT (OUTPATIENT)
Dept: FAMILY MEDICINE | Facility: CLINIC | Age: 29
End: 2024-03-12
Payer: COMMERCIAL

## 2024-03-12 VITALS
DIASTOLIC BLOOD PRESSURE: 81 MMHG | HEART RATE: 77 BPM | TEMPERATURE: 97.9 F | SYSTOLIC BLOOD PRESSURE: 112 MMHG | RESPIRATION RATE: 16 BRPM | OXYGEN SATURATION: 98 %

## 2024-03-12 DIAGNOSIS — R07.0 THROAT PAIN: Primary | ICD-10-CM

## 2024-03-12 LAB
DEPRECATED S PYO AG THROAT QL EIA: NEGATIVE
GROUP A STREP BY PCR: NOT DETECTED

## 2024-03-12 PROCEDURE — 99213 OFFICE O/P EST LOW 20 MIN: CPT

## 2024-03-12 PROCEDURE — 87651 STREP A DNA AMP PROBE: CPT

## 2024-03-12 RX ORDER — DEXAMETHASONE 4 MG/1
4 TABLET ORAL 2 TIMES DAILY WITH MEALS
Qty: 6 TABLET | Refills: 0 | Status: SHIPPED | OUTPATIENT
Start: 2024-03-12 | End: 2024-03-15

## 2024-03-12 NOTE — PROGRESS NOTES
URGENT CARE  Assessment & Plan   Assessment:   Paris Reddy is a 28 year old female who's clinical presentation today is consistent with:   1. Throat pain  - Streptococcus A Rapid Screen w/Reflex to PCR - Clinic Collect  - dexAMETHasone (DECADRON) 4 MG tablet  Plan:  Will treat patient with supportive and symptomatic measures for pharyngitis at this time which include: Fluids, rest, over-the-counter medications; Educated patient that honey, warm fluids and gargling saltwater can also help, given patient's severity of pain will also try a steroid for swelling and pain   additionally tested for bacterial cause and rapid test was negative for streptococcal A; PCR test pending (updated pt results will come via mychart/call and rx will be reflexed if positive), additionally, educated patient to monitor their symptoms for improvement over the next week and to return for a follow up if the sore throat and/or tonsil swelling does not improve in the next 5-7 days.   No alarm signs or symptoms present   Differential Diagnoses for this patient's chief complaint that I considered include:  Bacterial vs viral etiology of pharyngitis, peritonsillar abscess, Tonsillitis, mono      Patient is} agreeable to treatment plan and state they will follow-up if symptoms do not improve and/or if symptoms worsen   see patient's AVS 'monitor for' section for specific patient instructions given and discussed regarding what to watch for and when to follow up    SHAKIRA Hernandez [facility]      ______________________________________________________________________      Subjective     HPI: Paris Reddy  is a 28 year old  female who presents today for evaluation the following concerns:   Patient endorses a sore throat today which started 4 days ago   Patient reports they have been experiencing a sore throat and swollen tonsils with irritation. Patient also notes pain with swallowing and a slight cough     Patient denies any fevers, sweats, chills, myalgias, wheezing, shortness of breath, difficulty breathing, chest pain, weakness or signs of dehydration      Review of Systems:  Pertinent review of systems as reflected in HPI, otherwise negative.     Objective    Physical Exam:  Vitals:    03/12/24 1414   BP: 112/81   Pulse: 77   Resp: 16   Temp: 97.9  F (36.6  C)   TempSrc: Oral   SpO2: 98%      General: Alert and oriented, no acute distress, Vital signs reviewed: afebrile,  normotensive   Psy/mental status: Cooperative, nonanxious  SKIN: Intact, no rashes  EYES: EOMs intact, PERRLA bilaterally   Conjunctiva: Clear bilaterally, no injection or erythema present  EARS: TMs intact, translucent gray in color with normal landmarks present no erythema  or bulging tympanic membrane   Canals are without swelling, however have a mild amount of cerumen, no impaction  NOSE:  mucosa moist               No frontal or maxillary sinus tenderness present bilaterally  MOUTH/THROAT: lips, tongue, & oral mucosa appear normal upon inspection               Posterior oropharynx is erythematous but without exudate, lesions or tonsillar  Edema, no dysphonia, no unilateral tonsillar edema, no uvular deviation,   no signs of peritonsillar abscess  NECK: supple, has full range of motion with no meningeal signs              No lymphadenopathy present  LUNG: normal work of breathing, good respiratory effort without retractions, good air  movement, non labored, inspection reveals normal chest expansion w/  inspiration     LABS:   Results for orders placed or performed in visit on 03/12/24   Streptococcus A Rapid Screen w/Reflex to PCR - Clinic Collect     Status: Normal    Specimen: Throat; Swab   Result Value Ref Range    Group A Strep antigen Negative Negative     ______________________________________________________________________    I explained my diagnostic considerations and recommendations to the patient, who voiced understanding and  agreement with the treatment plan.   All questions were answered.   We discussed potential side effects, risks and benefits of any prescribed or recommended therapies, as well as expectations for response to treatments.  Please see AVS for any patient instructions & handouts given.   Patient was advised to contact the Nurse Care Line, their Primary Care provider, Urgent Care, or the Emergency Department if there are new or worsening symptoms, or call 911 for emergencies.

## 2024-03-12 NOTE — PATIENT INSTRUCTIONS
Diagnosis viral} pharyngitis    Today we did:  Throat swab -  rapid negative, culture pending}   Start steroids to help with pain and swelling        Plan:       NEGATIVE  NEGATIVE STREP TEST - VIRAL Pharyngitis aka  viral sore throat   Strep test was negative.  Monitor for improvement vs worsening  Possibly need throat culture vs other testing (mono)     Viruses causing pharyngitis usually lasts anywhere from 10-14 days     Treatment To DOs:   Fluids: you need to drink lots of fluids: water, electrolytes, broth    Rest: you need lots and lots of rest to get better, you have permission to sleep all day and stay home from work or school until you feel better   Fever/ body aches: ibuprofen (>6 months ) or Tylenol (> 3 month)     Symptoms: - what is your most bothersome symptom?     sore throat:   gargle saltwater, honey, warm fluids          cough drops or lozenges:    Cepacol Lozenge / Benzocaine     Children >5yrs old : one lozenge every 2 hours   Herbal:    - honey = good for cough suppressant    - zinc = 2-3mg lozenge Q2hrs    - menthol vapor rup on chest before sleep           Monitor for:   Continued pain or fevers   Difficulty opening jaw,   Voice changes  uvular deviation,   unilateral swelling of peritonsillar region  Fever of 101 F or higher that is not resolving w/ tylenol or ibuprofen   Difficulty breathing: shortness of breath, wheezing, chest pain with breathing   Signs of dehydration: Feeling weak, dizzy or that you might faint  Difficult or painful swallowing that is preventing you from eating or drinking   A skin rash - little red bumps on  your skin   Trouble breathing or catching your breath  Drooling and problems swallowing  Wheezing  Unable to talk  Feeling dizzy or faint  Feeling of doom}

## 2024-07-06 ENCOUNTER — HEALTH MAINTENANCE LETTER (OUTPATIENT)
Age: 29
End: 2024-07-06

## 2025-07-13 ENCOUNTER — HEALTH MAINTENANCE LETTER (OUTPATIENT)
Age: 30
End: 2025-07-13